# Patient Record
Sex: MALE | Race: WHITE | NOT HISPANIC OR LATINO | Employment: FULL TIME | URBAN - METROPOLITAN AREA
[De-identification: names, ages, dates, MRNs, and addresses within clinical notes are randomized per-mention and may not be internally consistent; named-entity substitution may affect disease eponyms.]

---

## 2024-09-18 ENCOUNTER — PREP FOR PROCEDURE (OUTPATIENT)
Dept: ANESTHESIOLOGY | Facility: HOSPITAL | Age: 54
End: 2024-09-18
Payer: COMMERCIAL

## 2024-09-18 DIAGNOSIS — M16.11 PRIMARY OSTEOARTHRITIS OF RIGHT HIP: Primary | ICD-10-CM

## 2024-09-18 RX ORDER — SODIUM CHLORIDE, SODIUM LACTATE, POTASSIUM CHLORIDE, CALCIUM CHLORIDE 600; 310; 30; 20 MG/100ML; MG/100ML; MG/100ML; MG/100ML
20 INJECTION, SOLUTION INTRAVENOUS CONTINUOUS
OUTPATIENT
Start: 2024-09-18

## 2024-09-20 ENCOUNTER — TELEPHONE (OUTPATIENT)
Dept: ORTHOPEDIC SURGERY | Facility: HOSPITAL | Age: 54
End: 2024-09-20
Payer: COMMERCIAL

## 2024-09-20 NOTE — TELEPHONE ENCOUNTER
Thank you for taking my call today, you have been scheduled for a Joint Replacement class on Tuesday October 22, 2024 from 2:30pm-4:30pm at [facility].  We are located at [address] [place] [address].    You will enter the parking lot off of [facility] and enter the main entrance immediately on your right.    This class is to help you prepare for your Right Anterior Hip Replacement    Upon receipt of your CT authorization, we will schedule that appointment.  The plan is to schedule the CT at 2:00pm prior to class.  You will receive notification via [employer] once this is scheduled.    Please be sure to check your Care Companion Pathway for surveys to complete before class!  We will use this information to track your progress throughout recovery.    Please arrive 15 minutes early. Class will be held on the 2nd floor nursing unit, nursing home down the will in the nutrition area. If you need assistance, please ask staff to direct you to joint replacement class. It is encouraged that you bring your care partner or someone who will be helping you after surgery to this class so that they understand the process also.     Please don't hesitate to reach out if you have any additional questions or concerns.    Gabbie Pandey MBA, BSN, RN-BC  Orthopedic Program Navigator  [facility]   [phone]

## 2024-10-22 ENCOUNTER — HOSPITAL ENCOUNTER (OUTPATIENT)
Dept: RADIOLOGY | Facility: HOSPITAL | Age: 54
Discharge: HOME | End: 2024-10-22
Payer: COMMERCIAL

## 2024-10-22 ENCOUNTER — EDUCATION (OUTPATIENT)
Dept: ORTHOPEDIC SURGERY | Facility: HOSPITAL | Age: 54
End: 2024-10-22
Payer: COMMERCIAL

## 2024-10-22 DIAGNOSIS — M25.551 PAIN IN RIGHT HIP: ICD-10-CM

## 2024-10-22 PROCEDURE — 73700 CT LOWER EXTREMITY W/O DYE: CPT | Mod: RT

## 2024-10-22 NOTE — GROUP NOTE
In addition to the group class activities, Sumanth Ascencio had the following lab work completed:  No orders of the defined types were placed in this encounter.      A new History and Physical was not completed.    This class lasted approximately 2 hours and had 8 participants. The nurse instructor covered the following topics:    MyChart Enrollment  Communication with Care Team  My Chart is the best form of communication to reach all of your caregivers  You can send messages to specific care givers, or a care team  Continued Education  You will be enrolled in a Total Joint Replacement care plan to receive additional education before and after surgery  You can review a short recording of the class content  Access to Medical Records  You can access test results, office notes, appointments, etc.  You can connect to other healthcare systems who use [employer] ([facility], [facility], [facility], etc.)  [employer]  Program Information  Consent to Enroll    Background/Understanding of Joint Replacement Surgery  Potential for same day discharge  Any questions or concerns to be directed to the surgeon's office    How to Prepare for Surgery  Use of Nicotine Products/Smoking  Stop several weeks before surgery  Such products slow down the healing process and increase risk of post-op infection and complications  Clearance for Surgery  Medical Clearance by Specialists  Dental Clearance  Cracked/Broken/Loose teeth left untreated may postpone surgery  The importance of post-op antibiotics for dental visits per surgeon protocol  Preadmission Testing  **Potential for postponed surgery if appropriate clearance is not obtained  Medication Instruction  Follow instructions provided by the doctor who prescribes your medication (typically, but not limited to cardiologist)  Preadmission testing will provide additional instructions during your appointment on what to stop and what to take as you get closer to surgery  For clarification of these  instructions, please call preadmission testing directly - [phone]  Tips for Preparing the home for discharge from the hospital  Care Partner  Requirement for surgery, the patient must have a plan to have help at home  Potential for postponed surgery if plan for home support cannot be established  How the care partner can help after surgery  CHG Body Wash/Mouth Wash  Follow the instructions given at preadmission testing  Body wash is to be used on the body and hair for 5 washes  Mouthwash is to be used the night before and morning of surgery  **This is a system-wide protocol developed by infectious disease professionals, we will not alter our recommendations for those with sensitive skin or those who have special hair needs.  Please follow the instructions as they are written as this will provide the best infection prevention measures for surgery.  Should you have an allergy to one of the products, please discuss with your preadmission team**    What to Expect in the Hospital/At Home  Morning of Surgery NPO Guidelines  Nothing to eat after midnight  Water can be consumed up to 2 hours prior to arrival  Surgical and Post-Surgical Care Team  Surgical Team  Anesthesia Team  Nursing  Physical Therapy  Care Coordinating  Pharmacy  Hospital Arrival Instructions  Arrive at the time provided to you  Consider traffic patterns (rush-hour) based on arrival time  Have arrangements made for a ride home  If discharging same day, care partner should remain at the hospital  Recovering after Surgery  Recovery Room - Visitors are not brought back  Transition to hospital room - 2nd Floor, Visitors will be directed to your room  The presence of and strategies for controlling surgical pain and swelling  The importance of early mobility  Side effects after surgery  What to expect if staying overnight    Discharge Planning  The intended plan for discharge will be for patients to discharge home  All patients require a care partner  (family, friend, neighbor, etc.) to stay with the patient for the first few nights after surgery  The inability to secure help at home will postpone surgery  Home Care Services set up per surgeon order  Physical Therapy  Occupational Therapy  **If desired, private duty care can be arranged by the patient ahead of time**  Outpatient Physical Therapy per surgeon order    Recovering at Home  Wound Care  Follow wound care instructions found in your discharge paperwork  Bandage is water-resistant and you may shower with the bandage  Do not scrub directly over the bandage  Do not submerge in water until cleared (bathtub, hot tub, pool, etc.)    Post-Op Risk Prevention  Infection Prevention  Promptly seek treatment for any infections post-operatively  Routine dental visits must be postponed for 3 months after surgery  Your surgeon may require antibiotics prior to future dental visits  Any concerns for infection not related directly to the knee or the hip should be managed by your primary care provider  Blood Clots  Be sure to complete the course of blood thinning medication as prescribed by your surgeon  Movement every 1-2 hours during the day is encouraged to prevent blood clots  Monitor for signs of blood clots  Wear compression stockings as prescribed by your surgeon  Constipation  Constipation is common following surgery  Drink plenty of fluids  Take stool softener/laxative as prescribed by your surgeon  Move around frequently  Eat foods high in fiber  Fall Prevention  Prepare home ahead of time to clear space to move with walker  Remove throw rugs and electrical cords from walkways  Install railings near any stairways with more than 2 steps  Use night lights for increased visibility at night  Continue to use your assistive device until cleared by surgeon or physical therapy  Dislocation Prevention - Not all procedures will have dislocation precautions  Follow dislocation precautions provided by your surgeon  It is OK  to resume sexual activity about 6 weeks following surgery  Be sure to follow any dislocation precautions assigned    Durable Medical Equipment  Cold Therapy  Breg Cold Therapy Machines  Ice/Gel Packs  Assistive Devices  Folding Walker with Wheels (in the front only)  No Rollators  Crutches if approved by Physical Therapy and Surgeon after surgery  Hip Kits  Raised Toilet Seats  Additional Compression Stockings    Joint Preservation  Healthy Activities when Cleared  Walking  Swimming  Bike Riding  Activities to Avoid  Refrain from repetitive motions which have a high impact on the joint  Gradual Progression  Progress activity slowly, listen to your body  Common Findings - NORMAL after surgery  Clicking/Grinding  Numbness near incision    Physical Therapy  Prehabilitation exercises  START TODAY ON BOTH LEGS  Surgery Specific Precautions  Follow surgery specific precautions found in your discharge paperwork    Follow-Up Visit  All patients will see their surgeon for a follow up visit after surgery  The visit may range from 2-6 weeks after surgery and is surgeon specific      Please don't hesitate to reach out if you have any additional questions or concerns.    Gabbie Pandey MBA, BSN, RN-BC  Delphine Bautista RN  Orthopedic Program Navigators  [facility]   [phone]

## 2024-10-23 ENCOUNTER — APPOINTMENT (OUTPATIENT)
Dept: LAB | Facility: LAB | Age: 54
End: 2024-10-23
Payer: COMMERCIAL

## 2024-10-23 ENCOUNTER — PRE-ADMISSION TESTING (OUTPATIENT)
Dept: PREADMISSION TESTING | Facility: HOSPITAL | Age: 54
End: 2024-10-23
Payer: COMMERCIAL

## 2024-10-23 ENCOUNTER — LAB (OUTPATIENT)
Dept: LAB | Facility: LAB | Age: 54
End: 2024-10-23
Payer: COMMERCIAL

## 2024-10-23 VITALS
HEART RATE: 75 BPM | RESPIRATION RATE: 16 BRPM | WEIGHT: 262.79 LBS | DIASTOLIC BLOOD PRESSURE: 85 MMHG | HEIGHT: 74 IN | BODY MASS INDEX: 33.73 KG/M2 | OXYGEN SATURATION: 96 % | TEMPERATURE: 97.1 F | SYSTOLIC BLOOD PRESSURE: 114 MMHG

## 2024-10-23 DIAGNOSIS — Z01.818 PREOP TESTING: ICD-10-CM

## 2024-10-23 DIAGNOSIS — R73.09 ELEVATED GLUCOSE: ICD-10-CM

## 2024-10-23 DIAGNOSIS — M16.11 PRIMARY OSTEOARTHRITIS OF RIGHT HIP: ICD-10-CM

## 2024-10-23 DIAGNOSIS — Z01.818 PREOP TESTING: Primary | ICD-10-CM

## 2024-10-23 LAB
ANION GAP SERPL CALC-SCNC: 13 MMOL/L (ref 10–20)
ATRIAL RATE: 72 BPM
BASOPHILS # BLD AUTO: 0.03 X10*3/UL (ref 0–0.1)
BASOPHILS NFR BLD AUTO: 0.4 %
BUN SERPL-MCNC: 26 MG/DL (ref 6–23)
CALCIUM SERPL-MCNC: 9.7 MG/DL (ref 8.6–10.3)
CHLORIDE SERPL-SCNC: 103 MMOL/L (ref 98–107)
CO2 SERPL-SCNC: 27 MMOL/L (ref 21–32)
CREAT SERPL-MCNC: 1.11 MG/DL (ref 0.5–1.3)
EGFRCR SERPLBLD CKD-EPI 2021: 79 ML/MIN/1.73M*2
EOSINOPHIL # BLD AUTO: 0.09 X10*3/UL (ref 0–0.7)
EOSINOPHIL NFR BLD AUTO: 1.3 %
ERYTHROCYTE [DISTWIDTH] IN BLOOD BY AUTOMATED COUNT: 11.9 % (ref 11.5–14.5)
EST. AVERAGE GLUCOSE BLD GHB EST-MCNC: 88 MG/DL
GLUCOSE SERPL-MCNC: 91 MG/DL (ref 74–99)
HBA1C MFR BLD: 4.7 %
HCT VFR BLD AUTO: 47.6 % (ref 41–52)
HGB BLD-MCNC: 16.5 G/DL (ref 13.5–17.5)
IMM GRANULOCYTES # BLD AUTO: 0.04 X10*3/UL (ref 0–0.7)
IMM GRANULOCYTES NFR BLD AUTO: 0.6 % (ref 0–0.9)
LYMPHOCYTES # BLD AUTO: 1.84 X10*3/UL (ref 1.2–4.8)
LYMPHOCYTES NFR BLD AUTO: 26.5 %
MCH RBC QN AUTO: 31.5 PG (ref 26–34)
MCHC RBC AUTO-ENTMCNC: 34.7 G/DL (ref 32–36)
MCV RBC AUTO: 91 FL (ref 80–100)
MONOCYTES # BLD AUTO: 0.64 X10*3/UL (ref 0.1–1)
MONOCYTES NFR BLD AUTO: 9.2 %
NEUTROPHILS # BLD AUTO: 4.3 X10*3/UL (ref 1.2–7.7)
NEUTROPHILS NFR BLD AUTO: 62 %
NRBC BLD-RTO: NORMAL /100{WBCS}
P AXIS: 12 DEGREES
P OFFSET: 195 MS
P ONSET: 141 MS
PLATELET # BLD AUTO: 246 X10*3/UL (ref 150–450)
POTASSIUM SERPL-SCNC: 4.2 MMOL/L (ref 3.5–5.3)
PR INTERVAL: 168 MS
Q ONSET: 225 MS
QRS COUNT: 12 BEATS
QRS DURATION: 74 MS
QT INTERVAL: 390 MS
QTC CALCULATION(BAZETT): 427 MS
QTC FREDERICIA: 414 MS
R AXIS: 44 DEGREES
RBC # BLD AUTO: 5.24 X10*6/UL (ref 4.5–5.9)
SODIUM SERPL-SCNC: 139 MMOL/L (ref 136–145)
T AXIS: 32 DEGREES
T OFFSET: 420 MS
VENTRICULAR RATE: 72 BPM
WBC # BLD AUTO: 6.9 X10*3/UL (ref 4.4–11.3)

## 2024-10-23 PROCEDURE — 85025 COMPLETE CBC W/AUTO DIFF WBC: CPT

## 2024-10-23 PROCEDURE — 83036 HEMOGLOBIN GLYCOSYLATED A1C: CPT

## 2024-10-23 PROCEDURE — 93005 ELECTROCARDIOGRAM TRACING: CPT

## 2024-10-23 PROCEDURE — 36415 COLL VENOUS BLD VENIPUNCTURE: CPT

## 2024-10-23 PROCEDURE — 80048 BASIC METABOLIC PNL TOTAL CA: CPT

## 2024-10-23 PROCEDURE — 93010 ELECTROCARDIOGRAM REPORT: CPT | Performed by: INTERNAL MEDICINE

## 2024-10-23 PROCEDURE — 87081 CULTURE SCREEN ONLY: CPT | Mod: BEALAB

## 2024-10-23 RX ORDER — BISMUTH SUBSALICYLATE 262 MG
1 TABLET,CHEWABLE ORAL DAILY
COMMUNITY

## 2024-10-23 RX ORDER — NAPROXEN 250 MG/1
250 TABLET ORAL 2 TIMES DAILY PRN
COMMUNITY

## 2024-10-23 RX ORDER — CHLORHEXIDINE GLUCONATE ORAL RINSE 1.2 MG/ML
15 SOLUTION DENTAL DAILY
Qty: 30 ML | Refills: 0 | Status: SHIPPED | OUTPATIENT
Start: 2024-10-23 | End: 2024-10-25

## 2024-10-23 RX ORDER — CHLORHEXIDINE GLUCONATE 40 MG/ML
SOLUTION TOPICAL DAILY PRN
Qty: 470 ML | Refills: 0 | Status: SHIPPED | OUTPATIENT
Start: 2024-10-23

## 2024-10-23 RX ORDER — LORATADINE 10 MG/1
10 TABLET ORAL DAILY
COMMUNITY

## 2024-10-23 ASSESSMENT — DUKE ACTIVITY SCORE INDEX (DASI)
CAN YOU RUN A SHORT DISTANCE: YES
CAN YOU DO HEAVY WORK AROUND THE HOUSE LIKE SCRUBBING FLOORS OR LIFTING AND MOVING HEAVY FURNITURE: YES
CAN YOU DO MODERATE WORK AROUND THE HOUSE LIKE VACUUMING, SWEEPING FLOORS OR CARRYING GROCERIES: YES
CAN YOU PARTICIPATE IN MODERATE RECREATIONAL ACTIVITIES LIKE GOLF, BOWLING, DANCING, DOUBLES TENNIS OR THROWING A BASEBALL OR FOOTBALL: YES
CAN YOU HAVE SEXUAL RELATIONS: YES
CAN YOU DO LIGHT WORK AROUND THE HOUSE LIKE DUSTING OR WASHING DISHES: YES
CAN YOU PARTICIPATE IN STRENOUS SPORTS LIKE SWIMMING, SINGLES TENNIS, FOOTBALL, BASKETBALL, OR SKIING: NO
CAN YOU TAKE CARE OF YOURSELF (EAT, DRESS, BATHE, OR USE TOILET): YES
CAN YOU WALK A BLOCK OR TWO ON LEVEL GROUND: YES
CAN YOU WALK INDOORS, SUCH AS AROUND YOUR HOUSE: YES
CAN YOU CLIMB A FLIGHT OF STAIRS OR WALK UP A HILL: YES
DASI METS SCORE: 9
CAN YOU DO YARD WORK LIKE RAKING LEAVES, WEEDING OR PUSHING A MOWER: YES
TOTAL_SCORE: 50.7

## 2024-10-23 ASSESSMENT — PAIN - FUNCTIONAL ASSESSMENT: PAIN_FUNCTIONAL_ASSESSMENT: 0-10

## 2024-10-23 ASSESSMENT — ENCOUNTER SYMPTOMS
GASTROINTESTINAL NEGATIVE: 1
RESPIRATORY NEGATIVE: 1
ARTHRALGIAS: 1
EYES NEGATIVE: 1
CARDIOVASCULAR NEGATIVE: 1
ENDOCRINE NEGATIVE: 1
NEUROLOGICAL NEGATIVE: 1
NECK NEGATIVE: 1
CONSTITUTIONAL NEGATIVE: 1

## 2024-10-23 ASSESSMENT — PAIN SCALES - GENERAL: PAINLEVEL_OUTOF10: 3

## 2024-10-23 ASSESSMENT — PAIN DESCRIPTION - DESCRIPTORS: DESCRIPTORS: SORE

## 2024-10-23 ASSESSMENT — LIFESTYLE VARIABLES: SMOKING_STATUS: NONSMOKER

## 2024-10-23 NOTE — CPM/PAT H&P
"CPM/PAT Evaluation       Name: Claudy Ascencio (Claudy Ascencio \"Sumanth\")  /Age: 1970/54 y.o.     Visit Type:   In-Person       Chief Complaint: OA R hip    HPI This is a 55 yo male who is referred to PAT by Dr Mcginnis for evaluation in advance of his R Arthroplasty hip, robotic assisted device 24. He failed conservative therapy. CT of hip 24 demonstrates prominent  osteophytosis about the acetabular rim with subcortical sclerosis and  cystic change. Also, prominent osteophytosis along the femoral head  neck junction with subcortical sclerosis and cystic change superior  margin of the femoral head.     See PMH below    Past Medical History:   Diagnosis Date    Asthma     No issues for a long time    CPAP (continuous positive airway pressure) dependence     On cpap for 2 years    Hearing aid worn     7 years    HL (hearing loss)     Long time    Joint pain     Several years       Past Surgical History:   Procedure Laterality Date    COLONOSCOPY      All clear    NO PAST SURGERIES         Patient  reports being sexually active and has had partner(s) who are female. He reports using the following method of birth control/protection: None.    Family History   Problem Relation Name Age of Onset    Cancer Mother Breast Cancer     Arrhythmia Father      Valvular heart disease Sister         No Known Allergies    Medication Documentation Review Audit       Reviewed by Shanell Ruvalcaba RN (Registered Nurse) on 10/23/24 at 1006      Medication Order Taking? Sig Documenting Provider Last Dose Status   loratadine (Claritin) 10 mg tablet [id number] Yes Take 1 tablet (10 mg) by mouth once daily. Historical Provider, MD 10/23/2024 Morning Active   multivitamin tablet [id number] Yes Take 1 tablet by mouth once daily. Historical Provider, MD Past Week Active   naproxen (Naprosyn) 250 mg tablet [id number] Yes Take 1 tablet (250 mg) by mouth 2 times a day as needed for mild pain (1 - 3). Historical " Provider, MD 10/22/2024 Active                         PAT ROS:   Constitutional:   neg    Neuro/Psych:   neg    Eyes:   neg    Ears:   neg    Nose:   neg    Mouth:   neg    Throat:   neg    Neck:   neg    Cardio:   neg    Respiratory:   neg    Endocrine:   neg    GI:   neg    :   neg    Musculoskeletal:    See hpi   arthralgias  Hematologic:   neg    Skin:  neg        Physical Exam  Vitals and nursing note reviewed. Physical exam within normal limits.          PAT AIRWAY:   Airway:     Mallampati::  III    TM distance::  >3 FB    Neck ROM::  Full      Visit Vitals  /85   Pulse 75   Temp 36.2 °C (97.1 °F) (Temporal)   Resp 16       DASI Risk Score      Flowsheet Row Pre-Admission Testing from 10/23/2024 in [facility] Questionnaire Series Submission from 10/17/2024 in [facility] with Generic Provider Manas   Can you take care of yourself (eat, dress, bathe, or use toilet)?  2.75 filed at 10/23/2024 1043 2.75  filed at 10/17/2024 1124   Can you walk indoors, such as around your house? 1.75 filed at 10/23/2024 1043 1.75  filed at 10/17/2024 1124   Can you walk a block or two on level ground?  2.75 filed at 10/23/2024 1043 2.75  filed at 10/17/2024 1124   Can you climb a flight of stairs or walk up a hill? 5.5 filed at 10/23/2024 1043 5.5  filed at 10/17/2024 1124   Can you run a short distance? 8 filed at 10/23/2024 1043 0  filed at 10/17/2024 1124   Can you do light work around the house like dusting or washing dishes? 2.7 filed at 10/23/2024 1043 2.7  filed at 10/17/2024 1124   Can you do moderate work around the house like vacuuming, sweeping floors or carrying groceries? 3.5 filed at 10/23/2024 1043 3.5  filed at 10/17/2024 1124   Can you do heavy work around the house like scrubbing floors or lifting and moving heavy furniture?  8 filed at 10/23/2024 1043 8  filed at 10/17/2024 1124   Can you do yard work like raking leaves, weeding or pushing a mower? 4.5 filed at 10/23/2024 1043 4.5   filed at 10/17/2024 1124   Can you have sexual relations? 5.25 filed at 10/23/2024 1043 5.25  filed at 10/17/2024 1124   Can you participate in moderate recreational activities like golf, bowling, dancing, doubles tennis or throwing a baseball or football? 6 filed at 10/23/2024 1043 6  filed at 10/17/2024 1124   Can you participate in strenous sports like swimming, singles tennis, football, basketball, or skiing? 0 filed at 10/23/2024 1043 0  filed at 10/17/2024 1124   DASI SCORE 50.7 filed at 10/23/2024 1043 42.7  filed at 10/17/2024 1124   METS Score (Will be calculated only when all the questions are answered) 9 filed at 10/23/2024 1043 8  filed at 10/17/2024 1124          Caprini DVT Assessment      Flowsheet Row [facility]Admission [facility]   DVT Score 9 filed at 10/23/2024 1044   Surgical Factors Elective major lower extremity arthroplasty filed at 10/23/2024 1044   BMI 31-40 (Obesity) filed at 10/23/2024 1044          Modified Frailty Index      Flowsheet Row Pre-Admission Testing from 10/23/2024 in [facility]   Non-independent functional status (problems with dressing, bathing, personal grooming, or cooking) 0 filed at 10/23/2024 1043   History of diabetes mellitus  0 filed at 10/23/2024 1043   History of COPD 0 filed at 10/23/2024 1043   History of CHF No filed at 10/23/2024 1043   History of MI 0 filed at 10/23/2024 1043   History of Percutaneous Coronary Intervention, Cardiac Surgery, or Angina No filed at 10/23/2024 1043   Hypertension requiring the use of medication  0 filed at 10/23/2024 1043   Peripheral vascular disease 0 filed at 10/23/2024 1043   Impaired sensorium (cognitive impairement or loss, clouding, or delirium) 0 filed at 10/23/2024 1043   TIA or CVA withouy residual deficit 0 filed at 10/23/2024 1043   Cerebrovascular accident with deficit 0 filed at 10/23/2024 1043   Modified Frailty Index Calculator 0 filed at 10/23/2024 104           CHADS2 Stroke Risk         N/A 3 to 100%: High Risk   2 to < 3%: Medium Risk   0 to < 2%: Low Risk     Last Change: N/A          This score determines the patient's risk of having a stroke if the patient has atrial fibrillation.        This score is not applicable to this patient. Components are not calculated.          Revised Cardiac Risk Index      Flowsheet Row Pre-Admission Testing from 10/23/2024 in [facility]   High-Risk Surgery (Intraperitoneal, Intrathoracic,Suprainguinal vascular) 0 filed at 10/23/2024 1043   History of ischemic heart disease (History of MI, History of positive exercuse test, Current chest paint considered due to myocardial ischemia, Use of nitrate therapy, ECG with pathological Q Waves) 0 filed at 10/23/2024 1043   History of congestive heart failure (pulmonary edemia, bilateral rales or S3 gallop, Paroxysmal nocturnal dyspnea, CXR showing pulmonary vascular redistribution) 0 filed at 10/23/2024 1043   History of cerebrovascular disease (Prior TIA or stroke) 0 filed at 10/23/2024 1043   Pre-operative insulin treatment 0 filed at 10/23/2024 1043   Pre-operative creatinine>2 mg/dl 0 filed at 10/23/2024 1043   Revised Cardiac Risk Calculator 0 filed at 10/23/2024 1043          Apfel Simplified Score      Flowsheet Row Pre-Admission Testing from 10/23/2024 in [facility]   Smoking status 1 filed at 10/23/2024 1043   History of motion sickness or PONV  1 filed at 10/23/2024 1043   Use of postoperative opioids 0 filed at 10/23/2024 1043   Gender - Female 0=No filed at 10/23/2024 1043   Apfel Simplified Score Calculator 2 filed at 10/23/2024 1043          Risk Analysis Index Results This Encounter         10/23/2024  1042             Do you live in a place other than your own home?: 1    Any history of chronic (long-term) congestive heart failure (CHF)?: 0 No    Any shortness of breath when resting?: 0 No    In the past five years, have you been diagnosed  with or treated for cancer?: No    MILLAN Cancer History: Patient does not indicate history of cancer          Stop Bang Score      Flowsheet Row Pre-Admission Testing from 10/23/2024 in [facility] Questionnaire Series Submission from 10/17/2024 in [facility] with Generic Provider Manas   Do you snore loudly? 1 filed at 10/23/2024 1011 1  filed at 10/17/2024 1124   Do you often feel tired or fatigued after your sleep? 0 filed at 10/23/2024 1011 0  filed at 10/17/2024 1124   Has anyone ever observed you stop breathing in your sleep? 1 filed at 10/23/2024 1011 1  filed at 10/17/2024 1124   Do you have or are you being treated for high blood pressure? 0 filed at 10/23/2024 1011 0  filed at 10/17/2024 1124   Recent BMI (Calculated) 34.2 filed at 10/23/2024 1011 32.1  filed at 10/17/2024 1124   Is BMI greater than 35 kg/m2? 0=No filed at 10/23/2024 1011 0=No  filed at 10/17/2024 1124   Age older than 50 years old? 1=Yes filed at 10/23/2024 1011 1=Yes  filed at 10/17/2024 1124   Is your neck circumference greater than 17 inches (Male) or 16 inches (Female)? 1 filed at 10/23/2024 1011 --   Gender - Male 1=Yes filed at 10/23/2024 1011 1=Yes  filed at 10/17/2024 1124   STOP-BANG Total Score 5 filed at 10/23/2024 1011 --          Prodigy: High Risk  Total Score: 8              Prodigy Gender Score          ARISCAT Score for Postoperative Pulmonary Complications      Flowsheet Row Pre-Admission Testing from 10/23/2024 in [facility]   Age, years  3 filed at 10/23/2024 1042   Preoperative SpO2 0 filed at 10/23/2024 1042   Respiratory infection in the last month Either upper or lower (i.e., URI, bronchitis, pneumonia), with fever and antibiotic treatment 0 filed at 10/23/2024 1042   Preoperative anemoa (Hgb less than 10 g/dl) 0 filed at 10/23/2024 1042   Surgical incision  0 filed at 10/23/2024 1042   Duration of surgery  16 filed at 10/23/2024 1042   Emergency Procedure  0 filed at 10/23/2024  "1042   ARISCAT Total Score  13 filed at 10/23/2024 1042          Chaz Perioperative Risk for Myocardial Infarction or Cardiac Arrest (DOMINIC)      Flowsheet Row Pre-Admission Testing from 10/23/2024 in [facility]   Age 1.06 filed at 10/23/2024 1042   Functional Status  0 filed at 10/23/2024 1042   ASA Class  -3.29 filed at 10/23/2024 1042   Creatinine 0 filed at 10/23/2024 1042   Type of Procedure  0.80 filed at 10/23/2024 1042   DOMINIC Total Score  -2.49 filed at 10/23/2024 1042        Assessment and Plan   55 yo male presents to [facility] for risk assessment and preoperative optimization in advance of his hip surgery.     Today VS stable, He is having ongoing R pain level 3/10, rest makes him feel better     Neuro:  No diagnosis or significant findings on chart review or clinical presentation and evaluation.     HEENT/Airway:  [place], wears hearing aid    Cardiovascular:  Denies chest pain, shortness of breathe, dizziness, palpations, or lightheadedness    Acceptable surgical risk. No additional preoperative testing is currently indicated.    EKG - 10/23/24 preliminary SR    Pulmonary:  H/O asthma  H/O KYRIE, on cpap, instructed to bring on day of surgery    Renal:   No diagnosis or significant findings on chart review, or clinical presentation and evaluation.   No results found for: \"GLUCOSE\", \"CALCIUM\", \"NA\", \"K\", \"CO2\", \"CL\", \"BUN\", \"CREATININE\"    Endocrine:  No diagnosis or significant findings on chart review or clinical presentation and evaluation.     No results found for: \"HGBA1C\"    Hematologic:  No diagnosis or significant findings on chart review or clinical presentation and evaluation.  No results found for: \"WBC\", \"HGB\", \"HCT\", \"MCV\", \"PLT\"    Pt supplied education/VTE handout    Gastrointestinal:   No diagnosis or significant findings on chart review or clinical presentation and evaluation.   EAT-10 score of 0 - self-perceived oropharyngeal dysphagia scale (0-40)      :  No diagnosis or " significant findings on chart review or clinical presentation and evaluation.     Infectious disease:   No diagnosis or significant findings on chart review or clinical presentation and evaluation.   Prescription provided for CHG body wash and dental rinse. CHG use instructions reviewed and provided to patient.  Staph screen collected    Musculoskeletal:   See HPI    Preoperative risk assessment  ASA II   NSQIP - Predicted length of stay days  PAT Testing - bmp, cbc, A1c, EKG, MRSA    Anesthesia:  No anesthesia complications but has not had general anesthesia    denies dental issues  Smoker-denies  Drugs-denies  ETOH- 8 drinks per week  Denies  personal/family issues with Anesthesia  No nickel, shell fish, or iodine allergies    CHLORHEXIDINE .12% DENTAL RINSE E PRESCIRBED PER  INFECTION PREVENTION PROTOCOL. PATIENT EDUCATED   Patient advised to call PAT if does not receive Mouthwash    Face to Face patient contact time 45 min    UZMA Means 10/23/2024 10:58 AM

## 2024-10-23 NOTE — H&P (VIEW-ONLY)
"CPM/PAT Evaluation       Name: Claudy Ascencio (Claudy Ascencio \"Sumanth\")  /Age: 1970/54 y.o.     Visit Type:   In-Person       Chief Complaint: OA R hip    HPI This is a 55 yo male who is referred to PAT by Dr Mcginnis for evaluation in advance of his R Arthroplasty hip, robotic assisted device 24. He failed conservative therapy. CT of hip 24 demonstrates prominent  osteophytosis about the acetabular rim with subcortical sclerosis and  cystic change. Also, prominent osteophytosis along the femoral head  neck junction with subcortical sclerosis and cystic change superior  margin of the femoral head.     See PMH below    Past Medical History:   Diagnosis Date    Asthma     No issues for a long time    CPAP (continuous positive airway pressure) dependence     On cpap for 2 years    Hearing aid worn     7 years    HL (hearing loss)     Long time    Joint pain     Several years       Past Surgical History:   Procedure Laterality Date    COLONOSCOPY      All clear    NO PAST SURGERIES         Patient  reports being sexually active and has had partner(s) who are female. He reports using the following method of birth control/protection: None.    Family History   Problem Relation Name Age of Onset    Cancer Mother Breast Cancer     Arrhythmia Father      Valvular heart disease Sister         No Known Allergies    Medication Documentation Review Audit       Reviewed by Shanell Ruvalcaba RN (Registered Nurse) on 10/23/24 at 1006      Medication Order Taking? Sig Documenting Provider Last Dose Status   loratadine (Claritin) 10 mg tablet [id number] Yes Take 1 tablet (10 mg) by mouth once daily. Historical Provider, MD 10/23/2024 Morning Active   multivitamin tablet [id number] Yes Take 1 tablet by mouth once daily. Historical Provider, MD Past Week Active   naproxen (Naprosyn) 250 mg tablet [id number] Yes Take 1 tablet (250 mg) by mouth 2 times a day as needed for mild pain (1 - 3). Historical " Provider, MD 10/22/2024 Active                         PAT ROS:   Constitutional:   neg    Neuro/Psych:   neg    Eyes:   neg    Ears:   neg    Nose:   neg    Mouth:   neg    Throat:   neg    Neck:   neg    Cardio:   neg    Respiratory:   neg    Endocrine:   neg    GI:   neg    :   neg    Musculoskeletal:    See hpi   arthralgias  Hematologic:   neg    Skin:  neg        Physical Exam  Vitals and nursing note reviewed. Physical exam within normal limits.          PAT AIRWAY:   Airway:     Mallampati::  III    TM distance::  >3 FB    Neck ROM::  Full      Visit Vitals  /85   Pulse 75   Temp 36.2 °C (97.1 °F) (Temporal)   Resp 16       DASI Risk Score      Flowsheet Row Pre-Admission Testing from 10/23/2024 in [facility] Questionnaire Series Submission from 10/17/2024 in [facility] with Generic Provider Manas   Can you take care of yourself (eat, dress, bathe, or use toilet)?  2.75 filed at 10/23/2024 1043 2.75  filed at 10/17/2024 1124   Can you walk indoors, such as around your house? 1.75 filed at 10/23/2024 1043 1.75  filed at 10/17/2024 1124   Can you walk a block or two on level ground?  2.75 filed at 10/23/2024 1043 2.75  filed at 10/17/2024 1124   Can you climb a flight of stairs or walk up a hill? 5.5 filed at 10/23/2024 1043 5.5  filed at 10/17/2024 1124   Can you run a short distance? 8 filed at 10/23/2024 1043 0  filed at 10/17/2024 1124   Can you do light work around the house like dusting or washing dishes? 2.7 filed at 10/23/2024 1043 2.7  filed at 10/17/2024 1124   Can you do moderate work around the house like vacuuming, sweeping floors or carrying groceries? 3.5 filed at 10/23/2024 1043 3.5  filed at 10/17/2024 1124   Can you do heavy work around the house like scrubbing floors or lifting and moving heavy furniture?  8 filed at 10/23/2024 1043 8  filed at 10/17/2024 1124   Can you do yard work like raking leaves, weeding or pushing a mower? 4.5 filed at 10/23/2024 1043 4.5   filed at 10/17/2024 1124   Can you have sexual relations? 5.25 filed at 10/23/2024 1043 5.25  filed at 10/17/2024 1124   Can you participate in moderate recreational activities like golf, bowling, dancing, doubles tennis or throwing a baseball or football? 6 filed at 10/23/2024 1043 6  filed at 10/17/2024 1124   Can you participate in strenous sports like swimming, singles tennis, football, basketball, or skiing? 0 filed at 10/23/2024 1043 0  filed at 10/17/2024 1124   DASI SCORE 50.7 filed at 10/23/2024 1043 42.7  filed at 10/17/2024 1124   METS Score (Will be calculated only when all the questions are answered) 9 filed at 10/23/2024 1043 8  filed at 10/17/2024 1124          Caprini DVT Assessment      Flowsheet Row Pre-Admission Testing from 10/23/2024 in [facility]   DVT Score 9 filed at 10/23/2024 1044   Surgical Factors Elective major lower extremity arthroplasty filed at 10/23/2024 1044   BMI 31-40 (Obesity) filed at 10/23/2024 1044          Modified Frailty Index      Flowsheet Row Pre-Admission Testing from 10/23/2024 in [facility]   Non-independent functional status (problems with dressing, bathing, personal grooming, or cooking) 0 filed at 10/23/2024 1043   History of diabetes mellitus  0 filed at 10/23/2024 1043   History of COPD 0 filed at 10/23/2024 1043   History of CHF No filed at 10/23/2024 1043   History of MI 0 filed at 10/23/2024 1043   History of Percutaneous Coronary Intervention, Cardiac Surgery, or Angina No filed at 10/23/2024 1043   Hypertension requiring the use of medication  0 filed at 10/23/2024 1043   Peripheral vascular disease 0 filed at 10/23/2024 1043   Impaired sensorium (cognitive impairement or loss, clouding, or delirium) 0 filed at 10/23/2024 1043   TIA or CVA withouy residual deficit 0 filed at 10/23/2024 1043   Cerebrovascular accident with deficit 0 filed at 10/23/2024 1043   Modified Frailty Index Calculator 0 filed at 10/23/2024 1047           CHADS2 Stroke Risk         N/A 3 to 100%: High Risk   2 to < 3%: Medium Risk   0 to < 2%: Low Risk     Last Change: N/A          This score determines the patient's risk of having a stroke if the patient has atrial fibrillation.        This score is not applicable to this patient. Components are not calculated.          Revised Cardiac Risk Index      Flowsheet Row Pre-Admission Testing from 10/23/2024 in [facility]   High-Risk Surgery (Intraperitoneal, Intrathoracic,Suprainguinal vascular) 0 filed at 10/23/2024 1043   History of ischemic heart disease (History of MI, History of positive exercuse test, Current chest paint considered due to myocardial ischemia, Use of nitrate therapy, ECG with pathological Q Waves) 0 filed at 10/23/2024 1043   History of congestive heart failure (pulmonary edemia, bilateral rales or S3 gallop, Paroxysmal nocturnal dyspnea, CXR showing pulmonary vascular redistribution) 0 filed at 10/23/2024 1043   History of cerebrovascular disease (Prior TIA or stroke) 0 filed at 10/23/2024 1043   Pre-operative insulin treatment 0 filed at 10/23/2024 1043   Pre-operative creatinine>2 mg/dl 0 filed at 10/23/2024 1043   Revised Cardiac Risk Calculator 0 filed at 10/23/2024 1043          Apfel Simplified Score      Flowsheet Row Pre-Admission Testing from 10/23/2024 in [facility]   Smoking status 1 filed at 10/23/2024 1043   History of motion sickness or PONV  1 filed at 10/23/2024 1043   Use of postoperative opioids 0 filed at 10/23/2024 1043   Gender - Female 0=No filed at 10/23/2024 1043   Apfel Simplified Score Calculator 2 filed at 10/23/2024 1043          Risk Analysis Index Results This Encounter         10/23/2024  1042             Do you live in a place other than your own home?: 1    Any history of chronic (long-term) congestive heart failure (CHF)?: 0 No    Any shortness of breath when resting?: 0 No    In the past five years, have you been diagnosed  with or treated for cancer?: No    MILLAN Cancer History: Patient does not indicate history of cancer          Stop Bang Score      Flowsheet Row Pre-Admission Testing from 10/23/2024 in [facility] Questionnaire Series Submission from 10/17/2024 in [facility] with Generic Provider Manas   Do you snore loudly? 1 filed at 10/23/2024 1011 1  filed at 10/17/2024 1124   Do you often feel tired or fatigued after your sleep? 0 filed at 10/23/2024 1011 0  filed at 10/17/2024 1124   Has anyone ever observed you stop breathing in your sleep? 1 filed at 10/23/2024 1011 1  filed at 10/17/2024 1124   Do you have or are you being treated for high blood pressure? 0 filed at 10/23/2024 1011 0  filed at 10/17/2024 1124   Recent BMI (Calculated) 34.2 filed at 10/23/2024 1011 32.1  filed at 10/17/2024 1124   Is BMI greater than 35 kg/m2? 0=No filed at 10/23/2024 1011 0=No  filed at 10/17/2024 1124   Age older than 50 years old? 1=Yes filed at 10/23/2024 1011 1=Yes  filed at 10/17/2024 1124   Is your neck circumference greater than 17 inches (Male) or 16 inches (Female)? 1 filed at 10/23/2024 1011 --   Gender - Male 1=Yes filed at 10/23/2024 1011 1=Yes  filed at 10/17/2024 1124   STOP-BANG Total Score 5 filed at 10/23/2024 1011 --          Prodigy: High Risk  Total Score: 8              Prodigy Gender Score          ARISCAT Score for Postoperative Pulmonary Complications      Flowsheet Row Pre-Admission Testing from 10/23/2024 in [facility]   Age, years  3 filed at 10/23/2024 1042   Preoperative SpO2 0 filed at 10/23/2024 1042   Respiratory infection in the last month Either upper or lower (i.e., URI, bronchitis, pneumonia), with fever and antibiotic treatment 0 filed at 10/23/2024 1042   Preoperative anemoa (Hgb less than 10 g/dl) 0 filed at 10/23/2024 1042   Surgical incision  0 filed at 10/23/2024 1042   Duration of surgery  16 filed at 10/23/2024 1042   Emergency Procedure  0 filed at 10/23/2024  "1042   ARISCAT Total Score  13 filed at 10/23/2024 1042          Chaz Perioperative Risk for Myocardial Infarction or Cardiac Arrest (DOMINIC)      Flowsheet Row Pre-Admission Testing from 10/23/2024 in [facility]   Age 1.06 filed at 10/23/2024 1042   Functional Status  0 filed at 10/23/2024 1042   ASA Class  -3.29 filed at 10/23/2024 1042   Creatinine 0 filed at 10/23/2024 1042   Type of Procedure  0.80 filed at 10/23/2024 1042   DOMINIC Total Score  -2.49 filed at 10/23/2024 1042        Assessment and Plan   53 yo male presents to [facility] for risk assessment and preoperative optimization in advance of his hip surgery.     Today VS stable, He is having ongoing R pain level 3/10, rest makes him feel better     Neuro:  No diagnosis or significant findings on chart review or clinical presentation and evaluation.     HEENT/Airway:  [place], wears hearing aid    Cardiovascular:  Denies chest pain, shortness of breathe, dizziness, palpations, or lightheadedness    Acceptable surgical risk. No additional preoperative testing is currently indicated.    EKG - 10/23/24 preliminary SR    Pulmonary:  H/O asthma  H/O KYRIE, on cpap, instructed to bring on day of surgery    Renal:   No diagnosis or significant findings on chart review, or clinical presentation and evaluation.   No results found for: \"GLUCOSE\", \"CALCIUM\", \"NA\", \"K\", \"CO2\", \"CL\", \"BUN\", \"CREATININE\"    Endocrine:  No diagnosis or significant findings on chart review or clinical presentation and evaluation.     No results found for: \"HGBA1C\"    Hematologic:  No diagnosis or significant findings on chart review or clinical presentation and evaluation.  No results found for: \"WBC\", \"HGB\", \"HCT\", \"MCV\", \"PLT\"    Pt supplied education/VTE handout    Gastrointestinal:   No diagnosis or significant findings on chart review or clinical presentation and evaluation.   EAT-10 score of 0 - self-perceived oropharyngeal dysphagia scale (0-40)      :  No diagnosis or " significant findings on chart review or clinical presentation and evaluation.     Infectious disease:   No diagnosis or significant findings on chart review or clinical presentation and evaluation.   Prescription provided for CHG body wash and dental rinse. CHG use instructions reviewed and provided to patient.  Staph screen collected    Musculoskeletal:   See HPI    Preoperative risk assessment  ASA II   NSQIP - Predicted length of stay days  PAT Testing - bmp, cbc, A1c, EKG, MRSA    Anesthesia:  No anesthesia complications but has not had general anesthesia    denies dental issues  Smoker-denies  Drugs-denies  ETOH- 8 drinks per week  Denies  personal/family issues with Anesthesia  No nickel, shell fish, or iodine allergies    CHLORHEXIDINE .12% DENTAL RINSE E PRESCIRBED PER  INFECTION PREVENTION PROTOCOL. PATIENT EDUCATED   Patient advised to call PAT if does not receive Mouthwash    Face to Face patient contact time 45 min    UZMA Means 10/23/2024 10:58 AM

## 2024-10-23 NOTE — PREPROCEDURE INSTRUCTIONS
Medication List            Accurate as of October 23, 2024 10:23 AM. Always use your most recent med list.                * chlorhexidine 0.12 % solution  Commonly known as: Peridex  Use 15 mL in the mouth or throat once daily for 2 doses.  Medication Adjustments for Surgery: Take/Use as prescribed     * chlorhexidine 4 % external liquid  Commonly known as: Hibiclens  Apply topically once daily as needed for wound care.  Medication Adjustments for Surgery: Take/Use as prescribed     loratadine 10 mg tablet  Commonly known as: Claritin  Medication Adjustments for Surgery: Take on the morning of surgery  Notes to patient: If needed     multivitamin tablet  Additional Medication Adjustments for Surgery: Take last dose 7 days before surgery  Notes to patient: Last dose 10/29/24     naproxen 250 mg tablet  Commonly known as: Naprosyn  Additional Medication Adjustments for Surgery: Take last dose 7 days before surgery  Notes to patient: Last dose 10/29/24           * This list has 2 medication(s) that are the same as other medications prescribed for you. Read the directions carefully, and ask your doctor or other care provider to review them with you.                If no issues with your liver you may take Tylenol 2-500 mg tablets every 8 hrs around the clock for pain including morning of surgery with a sip of water    STOP VITAMINS, SUPPLEMENTS, HERBS, PROBIOTICS, AND FISH OIL, NO MOTRIN OR ADVIL OR ALEVE 7 DAYS BEFORE SURGERY       CONTACT SURGEON'S OFFICE IF YOU DEVELOP:  * Fever = 100.4 F   * New respiratory symptoms (e.g. cough, shortness of breath, respiratory distress, sore throat)  * Recent loss of taste or smell  *Flu like symptoms such as headache, fatigue or gastrointestinal symptoms  * You develop any open sores, shingles, burning or painful urination   AND/OR:  * You no longer wish to have the surgery.  * Any other personal circumstances change that may lead to the need to cancel or defer this  surgery.  *You were admitted to any hospital within one week of your planned procedure.     SMOKING:  *Quitting smoking can make a huge difference to your health and recovery from surgery.    *If you need help with quitting, call 5-048-QUIT-NOW.     THE DAY BEFORE SURGERY:  *Do not eat any food after midnight the night before your surgery.   *You may have up to TEN OUNCES of clear liquids until TWO hours before your instructed ARRIVAL TIME to hospital. This includes water, black tea/coffee, (no milk or cream) apple juice, clear broth and electrolyte drinks (Gatorade). Please avoid clear liquids that are red in color.   *You may chew gum/mints up to TWO hours before your surgery/procedure.    Additional Instructions:      Seven/Six Days before Surgery:  Review your medication instructions, stop indicated medications  Five Days before Surgery:  Review your medication instructions, stop indicated medications  Begin using your Hibiclens, if prescribed  Three Days before Surgery:  Review your medication instructions, stop indicated medications  The Day before Surgery:  Start using 0.12% CHG mouthwash-if prescribed  No smoking or alcohol use 24 hours before surgery  Review your medication instructions, stop indicated medications  You will be contacted regarding the time of your arrival to facility and surgery time  Do not eat any food after Midnight  Day of Surgery:  Review your medication instructions, take indicated medications  If you have diabetes, please check your fasting blood sugar upon awakening.  If fasting blood sugar is <80 mg/dl, drink 100 ml of apple juice, time limit of 2 hours before  You may have clear liquids until TWO hours before surgery/procedure.  This includes water, black tea/coffee, (no milk or cream) apple juice and electrolyte drinks (Gatorade)  You may chew gum up to TWO hours before your surgery/procedure  Wear  comfortable loose fitting clothing  Do not use moisturizers, creams, lotions or  perfume  All jewelry and valuables should be left at home     SURGICAL TIME:  *You will be contacted between 2 p.m. and 3 p.m. the business day before your surgery with your arrival time.  *If you haven't received a call by 3pm, call [phone]  *Scheduled surgery times may change and you will be notified if this occurs-check your personal voicemail for any updates.     ON THE MORNING OF SURGERY:  *Wear comfortable, loose fitting clothing.   *Do not use moisturizers, creams, lotions or perfume.  *All jewelry and valuables should be left at home.  *Prosthetic devices such as contact lenses, hearing aids, dentures, eyelash extensions, hairpins and body piercing must be removed before surgery.     BRING WITH YOU:  *Photo ID and insurance card  *Current list of medications and allergies  *Pacemaker/Defibrillator/Heart stent cards  *CPAP machine and mask  *Slings/splints/crutches  *Copy of your complete Advanced Directive/DHPOA-if applicable  *Neurostimulator implant remote     PARKING AND ARRIVAL:  *Check in at the Main Entrance desk and let them know you are here for surgery.     IF YOU ARE HAVING OUTPATIENT/SAME DAY SURGERY:  *A responsible adult MUST accompany you at the time of discharge and stay with you for 24 hours after your surgery.  *You may NOT drive yourself home after surgery.  *You may use a taxi or ride sharing service ([employer], [employer]) to return home ONLY if you are accompanied by a friend or family member.  *Instructions for resuming your medications will be provided by your surgeon.     Thank you for coming to Pre Admission testing.      If I have prescribe medication please don't forget to  at your pharmacy.      Any questions about today's visit call [phone] and leave a message in the general mailbox.     Patient instructed to ambulate as soon as possible postoperatively to decrease thromboembolic risk.     Olga Candelaria, APRN-CNP     Thank you for visiting the [facility].  If you have any changes to your health condition, please call the surgeons office to alert them and give them details of your symptoms.        Preoperative Fasting Guidelines     Why must I stop eating and drinking near surgery time?  With sedation, food or liquid in your stomach can enter your lungs causing serious complications  Increases nausea and vomiting     When do I need to stop eating and drinking before my surgery?  Do not eat any food after midnight the night before your surgery/procedure.  You may have up to TEN ounces of clear liquid until TWO hours before your instructed arrival time to the hospital.  This includes water, black tea/coffee, (no milk or cream) apple juice, and electrolyte drinks (Gatorade)  You may chew gum until TWO hours before your surgery/procedure       Preoperative Brain Exercises     What are brain exercises?  A brain exercise is any activity that engages your thinking (cognitive) skills.     What types of activities are considered brain exercises?  Jigsaw puzzles, crossword puzzles, word jumble, memory games, word search, and many more.  Many can be found free online or on your phone via a mobile amairani.     Why should I do brain exercises before my surgery?  More recent research has shown brain exercise before surgery can lower the risk of postoperative delirium (confusion) which can be especially important for older adults.  Patients who did brain exercises for 5 to 10 hours the days before surgery, cut their risk of postoperative delirium in half up to 1 week after surgery.                 [facility]     Preoperative Deep Breathing Exercises     Why it is important to do deep breathing exercises before my surgery?  Deep breathing exercises strengthen your breathing muscles.  This helps you to recover after your surgery and decreases the chance of breathing complications.        How are the deep breathing exercises done?  Sit straight with  your back supported.  Breathe in deeply and slowly through your nose. Your lower rib cage should expand and your abdomen may move forward.  Hold that breath for 3 to 5 seconds.  Breathe out through pursed lips, slowly and completely.  Rest and repeat 10 times every hour while awake.  Rest longer if you become dizzy or lightheaded.                [facility]     Preoperative Deep Breathing Exercises     Why it is important to do deep breathing exercises before my surgery?  Deep breathing exercises strengthen your breathing muscles.  This helps you to recover after your surgery and decreases the chance of breathing complications.        How are the deep breathing exercises done?  Sit straight with your back supported.  Breathe in deeply and slowly through your nose. Your lower rib cage should expand and your abdomen may move forward.  Hold that breath for 3 to 5 seconds.  Breathe out through pursed lips, slowly and completely.  Rest and repeat 10 times every hour while awake.  Rest longer if you become dizzy or lightheaded.        Patient Information: Incentive Spirometer  What is an incentive spirometer?  An incentive spirometer is a device used before and after surgery to “exercise” your lungs.  It helps you to take deeper breaths to expand your lungs.  Below is an example of a basic incentive spirometer.  The device you receive may differ slightly but they all function the same.    Why do I need to use an incentive spirometer?  Using your incentive spirometer prepares your lungs for surgery and helps prevent lung problems after surgery.  How do I use my incentive spirometer?  When you're using your incentive spirometer, make sure to breathe through your mouth. If you breathe through your nose, the incentive spirometer won't work properly. You can hold your nose if you have trouble.  If you feel dizzy at any time, stop and rest. Try again at a later time.  Follow the steps below:  Set up  your incentive spirometer, expand the flexible tubing and connect to the outlet.  Sit upright in a chair or bed. Hold the incentive spirometer at eye level.   Put the mouthpiece in your mouth and close your lips tightly around it. Slowly breathe out (exhale) completely.  Breathe in (inhale) slowly through your mouth as deeply as you can. As you take a breath, you will see the piston rise inside the large column. While the piston rises, the indicator should move upwards. It should stay in between the 2 arrows (see Figure).  Try to get the piston as high as you can, while keeping the indicator between the arrows.   If the indicator doesn't stay between the arrows, you're breathing either too fast or too slow.  When you get it as high as you can, hold your breath for 10 seconds, or as long as possible. While you're holding your breath, the piston will slowly fall to the base of the spirometer.  Once the piston reaches the bottom of the spirometer, breathe out slowly through your mouth. Rest for a few seconds.  Repeat 10 times. Try to get the piston to the same level with each breath.  Repeat every hour while awake  You can carefully clean the outside of the mouthpiece with an alcohol wipe or soap and water.       Patient and Family Education             Ways You Can Help Prevent Blood Clots                    This handout explains some simple things you can do to help prevent blood clots.      Blood clots are blockages that can form in the body's veins. When a blood clot forms in your deep veins, it may be called a deep vein thrombosis, or DVT for short. Blood clots can happen in any part of the body where blood flows, but they are most common in the arms and legs. If a piece of a blood clot breaks free and travels to the lungs, it is called a pulmonary embolus (PE). A PE can be a very serious problem.         Being in the hospital or having surgery can raise your chances of getting a blood clot because you may not be  well enough to move around as much as you normally do.         Ways you can help prevent blood clots in the hospital           Wearing SCDs. SCDs stands for Sequential Compression Devices.   SCDs are special sleeves that wrap around your legs  They attach to a pump that fills them with air to gently squeeze your legs every few minutes.   This helps return the blood in your legs to your heart.   SCDs should only be taken off when walking or bathing.   SCDs may not be comfortable, but they can help save your life.                                            Wearing compression stockings - if your doctor orders them. These special snug fitting stockings gently squeeze your legs to help blood flow.       Walking. Walking helps move the blood in your legs.   If your doctor says it is ok, try walking the halls at least   5 times a day. Ask us to help you get up, so you don't fall.      Taking any blood thinning medicines your doctor orders.        Page 1 of 2            [facility]; 3/23   Ways you can help prevent blood clots at home         Wearing compression stockings - if your doctor orders them. ? Walking - to help move the blood in your legs.       Taking any blood thinning medicines your doctor orders.      Signs of a blood clot or PE        Tell your doctor or nurse know right away if you have of the problems listed below.    If you are at home, seek medical care right away. Call 911 for chest pain or problems breathing.                Signs of a blood clot (DVT) - such as pain,  swelling, redness or warmth in your arm or leg      Signs of a pulmonary embolism (PE) - such as chest pain or feeling short of breath    Patient Information: Pre-Operative Infection Prevention Measures     Why did I have my nose, under my arms, and groin swabbed?  The purpose of the swab is to identify Staphylococcus aureus inside your nose or on your skin.  The swab was sent to the laboratory for culture.  A positive  swab/culture for Staphylococcus aureus is called colonization or carriage.      What is Staphylococcus aureus?  Staphylococcus aureus, also known as “staph”, is a germ found on the skin or in the nose of healthy people.  Sometimes Staphylococcus aureus can get into the body and cause an infection.  This can be minor (such as pimples, boils, or other skin problems).  It might also be serious (such as a blood infection, pneumonia, or a surgical site infection).    What is Staphylococcus aureus colonization or carriage?  Colonization or carriage means that a person has the germ but is not sick from it.  These bacteria can be spread on the hands or when breathing or sneezing.    How is Staphylococcus aureus spread?  It is most often spread by close contact with a person or item that carries it.    What happens if my culture is positive for Staphylococcus aureus?  Your doctor/medical team will use this information to guide any antibiotic treatment which may be necessary.  Regardless of the culture results, we will clean the inside of your nose with a betadine swab just before you have your surgery.      Will I get an infection if I have Staphylococcus aureus in my nose or on my skin?  Anyone can get an infection with Staphylococcus aureus.  However, the best way to reduce your risk of infection is to follow the instructions provided to you for the use of your CHG soap and dental rinse.        Patient Information: Oral/Dental Rinse    What is oral/dental rinse?   It is a mouthwash. It is a way of cleaning the mouth with a germ-killing solution before your surgery.  The solution contains chlorhexidine, commonly known as CHG.   It is used inside the mouth to kill a bacteria known as Staphylococcus aureus.  Let your doctor know if you are allergic to Chlorhexidine.    We have sent a prescription for CHG 0.12% mouthwash to your preferred pharmacy.  If you have not already, Please  your prescription and start using the  day before before surgery.  Follow the instruction sheet provided to you at your CPM/PAT appointment. Please contact [facility] if you do not receive your CHG mouthwash prescription.     Why do I need to use CHG oral/dental rinse?  The CHG oral/dental rinse helps to kill a bacteria in your mouth known as Staphylococcus aureus.     This reduces the risk of infection at the surgical site.      Using your CHG oral/dental rinse  STEPS:  Use your CHG oral/dental rinse after you brush your teeth the night before (at bedtime) and the morning of your surgery.  Follow all directions on your prescription label.    Use the cap on the container to measure 15ml   Swish (gargle if you can) the mouthwash in your mouth for at least 30 seconds, (do not swallow) and spit out  After you use your CHG rinse, do not rinse your mouth with water, drink or eat.  Please refer to the prescription label for the appropriate time to resume oral intake      What side effects might I have using the CHG oral/dental rinse?  CHG rinse will stick to plaque on the teeth.  Brush and floss just before use.  Teeth brushing will help avoid staining of plaque during use.      Patient Information: Home Preoperative Antibacterial Shower      What is a home preoperative antibacterial shower?  This shower is a way of cleaning the skin with a germ-killing solution before surgery.  The solution contains chlorhexidine, commonly known as CHG.  CHG is a skin cleanser with germ-killing ability.  Let your doctor know if you are allergic to chlorhexidine.    Why do I need to take a preoperative antibacterial shower?  Skin is not sterile.  It is best to try to make your skin as free of germs as possible before surgery.  Proper cleansing with a germ-killing soap before surgery can lower the number of germs on your skin.  This helps to reduce the risk of infection at the surgical site.  Following the instructions listed below will help you prepare your skin for surgery.       How do I use the solution?  Steps:  Begin using your CHG soap 5 days before your scheduled surgery on ________________________.    First, wash and rinse your hair using the CHG soap. Keep CHG soap away from ear canals and eyes.  Rinse completely, do not condition.  Hair extensions should be removed.  Wash your face with your normal soap and rinse.    Apply the CHG solution to a clean wet washcloth.  Turn the water off or move away from the water spray to avoid premature rinsing of the CHG soap as you are applying.   Firmly lather your entire body from the neck down.  Do not use on your face.  Pay special attention to the area(s) where your incision(s) will be located unless they are on your face.  Avoid scrubbing your skin too hard.  The important point is to have the CHG soap sit on your skin for 3 minutes.    When the 3 minutes are up, turn on the water and rinse the CHG solution off your body completely.   DO NOT wash with regular soap after you have used the CHG soap solution  Pat yourself dry with a clean, freshly-laundered towel.  DO NOT apply powders, deodorants, or lotions.  Dress in clean, freshly laundered nightclothes.    Be sure to sleep with clean, freshly laundered sheets.  Be aware that CHG will cause stains on fabrics; if you wash them with bleach after use.  Rinse your washcloth and other linens that have contact with CHG completely.  Use only non-chlorine detergents to launder the items used.   The morning of surgery is the fifth day.  Repeat the above steps and dress in clean comfortable clothing     Whom should I contact if I have any questions regarding the use of CHG soap?  Call the [facility] at [phone] if you have any questions.

## 2024-10-25 ENCOUNTER — TELEPHONE (OUTPATIENT)
Dept: ORTHOPEDIC SURGERY | Facility: HOSPITAL | Age: 54
End: 2024-10-25
Payer: COMMERCIAL

## 2024-10-25 LAB — STAPHYLOCOCCUS SPEC CULT: NORMAL

## 2024-10-25 NOTE — TELEPHONE ENCOUNTER
Thank you for taking my call today.  All questions were answered at the time of the call, but please feel free to reach out to me via [employer] or phone, [phone], with any new questions or concerns.       We confirmed that you opted to enroll in our [id number] program so your discharge prescriptions will be available to take home at the time of discharge.  Please bring any prescription insurance coverage with you on the morning of surgery so that we can enter the information into our system.     We confirmed that your plan would be to Discharge Home same day (Rapid Recovery).    We confirmed that you have DME needed for recovery.     Use the provided body wash for 4 days before surgery and complete a 5th shower on the morning of surgery, this includes your body and hair.  Follow the directions as provided during preadmission testing.  The mouth wash will be used the night before and the morning of surgery.       As a reminder, if you do not hear from our team, please call [phone] between 2pm and 3pm the business day before your surgery to confirm your arrival time and details.        Please don't hesitate to reach out with additional questions or concerns.    Gabbie Pandey MBA, BSN, RN-BC  MICHAEL BarrosoN, RN  [facility]  [facility]  [phone]

## 2024-11-05 NOTE — DISCHARGE INSTRUCTIONS
[facility]                          Gabbie Pandey MBA, BSN, RN-BC Orthopedic  Phone: [phone]    James Mcginnis D.O.  Phone: [phone]    POSTOPERATIVE INSTRUCTIONS: TOTAL HIP & TOTAL KNEE ARTHROPLASTY  General/highlights: Avoid straight leg raise or similar, wear the MAR hose during the daytime for the 14 days, remove at night.  This is more important on your surgical extremity.  Flex/tighten the muscles in the buttocks, thighs and calves often when in chair or bed.  Utilize the incentive spirometer often especially the next 3 days.  Walk at least 10 steps every hour that you are awake.  Take stairs 1 at a time, good leg leads up, bed leg legs down, use the handrails.  You may be weightbearing as tolerated, in general the walker is used for 2 weeks.  New discharge medications: Percocet to be taken as needed for pain.  Baclofen to be taken as needed for muscle spasm.  MiraLAX powder once or twice a day when taking the Percocet to avoid constipation.  Enteric-coated aspirin 81 mg by mouth twice a day for the next 2 weeks, this is your blood thinner and is very important to take.  PAIN, SWELLING & BRUISING  Some pain, stiffness and swelling is normal for up to 1 year after surgery.  Pain will start to let up over time depending on your activity level.  It is preferable to rest for 24 hours following your surgery  Pain is often delayed for 24-48 hours after surgery.  Pain may be dull/achy, throbbing, or even sharp/nerve sensations  It is normal for swelling and bruising to worsen before it gets better.  These symptoms usually peak 1 week after surgery  Swelling and bruising may appear throughout the leg, all the way down to your toes  Wear your compression stockings every day during the day for 14 days and remove them at night.  This will help control swelling    WOUND CARE INSTRUCTIONS  Your surgical bandage will be removed 2 weeks after surgery at your  post-operative visit.  If your bandage becomes compromised, begins to come off before then, or soaks through with drainage call the office immediately.  You may have sutures under the skin that dissolve on their own over time.    As the sutures absorb occasionally a small suture abscess can develop, this is not uncommon for up to 6 weeks, if this occurs please notify your surgeon immediately.    HYGIENE  You may shower 24 hours after your surgery, provided the Mepilex silver dressing is in place.  No tub bathing or submerging underwater.  Do not scrub directly over the surgical bandage  Do not use any creams, lotions or ointments on the surgical leg for 4 weeks after surgery, or until you have been cleared to do so by your surgeon    GENERAL INSTRUCTIONS  Do not drink alcoholic beverages (beer and wine included) for 24 hours following your surgery, or while you are taking narcotic pain medications  Delay making important decisions until you are fully recovered  You cannot swim or submerge in water for at least 6 weeks after surgery, or until you are cleared by your surgeon.  You may start kneeling 3 months after knee replacement surgery, once you have been cleared by your surgeon.  This may not ever feel “normal” or comfortable    HOME DIET  Resume your normal diet after surgery. If you are on a specific type of diet for your condition, resume that instead.    Choose foods that help promote good bowel habits and prevent constipation, such as foods high in fiber.    POSTOPERATIVE MEDICATIONS  Pain medications have been ordered to help manage pain throughout recovery.  While you are using narcotic pain medication, you should be using a stool softener or laxative to prevent constipation.  My preference is parallax powder once or twice a day when taking the narcotic pain medicine  It is important to eat a small meal or snack before taking pain medications to avoid nausea or stomach upset.    MEDICATION REFILLS -  "[phone]  If you need to request a medication refill, please call the office between 8:30am-4:30pm, Monday through Friday.    Any calls received outside of this timeframe will be handled on the next business day.    Medication requests received on Saturday or Sunday will be handled on Monday.    Please allow 3-5 business days for all medication requests to be processed.    RESTARTING HOME MEDICATIONS  You may restart your home medications the following day after your surgery UNLESS you have been given alternate instructions.    Follow the instructions given to you on your hospital discharge instructions for more information regarding your home medications.    ASSISTIVE DEVICE & MOVEMENT  Initially, you will use a walker or crutches to walk for the first 2 weeks.  Once your therapist feels you are ready, you will wean to one crutch or cane followed by no assistive device.  It is important to keep moving throughout recovery.  Walk at least 10 steps every hour that you are awake.  Stairs are part of your recovery, the \"good \"leg leads on the way up, the \"bad \"leg leads on the way down to, use the handrails and not the walker or crutches.  You should be up and walking around several times per day as well as bending your knee and making sure your knee is going completely straight (for knee replacements).  For anterior hip replacements avoid straight leg raise.    NUMBNESS/CLICKING  Decreased sensation or numbness is common on or around the area of the incision due to sensory nerves that are affected at the time of surgery.  The area of numbness will be lateral to the incision  You might always have numbness but the size of the area of numbness should decrease with time.  It is common for patients to have a “click” in the knee with movement.  This is usually nothing to be concerned about.  There are several reasons why your knee can be making these noises, including the implant components rubbing against each other, " or a tendons going over a bony prominence.  Grinding can also occur and is not out of the ordinary.  Grinding can be caused by scar tissue formation  Noises will often settle over time once muscle strength improves.    DIFFICULTY SLEEPING  It is very common for patients to have difficulty sleeping at night which can be caused pain, medication, or feeling of anxiety.  Sleep disturbance typically worsens 4-6 weeks after surgery.  You are permitted to sleep on your back or side with a  pillow between your legs for comfort    DRIVING & TRAVEL  Your surgeon will address this at your post-op appointment.  You must not be taking narcotic pain medication to be cleared to drive.  LEFT LEG JOINT REPLACMENT:  You may drive once you have regained full control of your leg, typically around 2 week after surgery  RIGHT LEG JOINT REPLACEMENT:  You must speak with your surgeon before you resume driving.  Driving can typically be resumed by 4-6 weeks after surgery.  During long distance travel, you should attempt to change position or stand every hour.  You should complete ankle pumps throughout your travel if you are sitting for long periods of time.  If traveling within the first 2 weeks after surgery, you should wear your compression stockings.    DENTAL & OTHER PROCEDURES  All patients must wait a minimum of 3 months for elective procedures, including routine dental cleanings.  For any dental appointment - cleaning or dental procedures - patients must take a prophylactic antibiotic 1 hour before the appointment.  You will also need to call for an antibiotic prior to any other invasive test, procedure, or surgery.  These prophylactic antibiotics will be needed for the rest of your life, in order to prevent infections.  Please call your surgeons office at [phone] to request the antibiotic.      PHYSICAL THERAPY  Following surgery it is important to progress through recovery with in-home or outpatient physical therapy.  You  should continue to complete home exercises provided from the hospital on days that you are not working with a physical therapist.  It is common to have a temporary increase in pain and swelling upon starting outpatient physical therapy and/or changing your exercise routine.  Continue to use ice to help with symptoms.     FOLLOW-UP APPOINTMENT - [phone]  Your post-surgical appointments will take place approximately 2 weeks, 6 weeks, 3 months and 1 year following surgery.  Joint replacements are monitored thereafter every 2-5 years for life.  If you have any questions or need to make changes to this appointment, please contact the office:    EMERGENCIES & WHEN TO CALL YOUR SURGEON  When to contact our office immediately:  Any falls or injury to the joint replacement  Fever >101.5 for at least 48 hours after surgery or chills.  Excessive bleeding from incision(s). A small amount of drainage is normal and expected.  Signs of infection of incision(s)-excessive drainage that is soaking through your dressing (especially if it is pus-like), redness that is spreading out from the edges of your incision, or increased warmth around the area.  Excruciating pain for which the pain medication, taken as instructed, is not helping.  Severe calf pain.  Go directly to the emergency room or call 911, if you are experiencing chest pain or difficulty breathing.    ICE/COLD THERAPY  Ice is most important during the first 2 weeks after surgery, but should be used for several weeks as needed.  Never place ice, or cold therapy devices directly on the skin.  You should always have a protective layer between your skin and the cold.  You have been prescribed to ice your total joint at a minimum of twice per hour for 20 minutes while awake during the first 6 weeks after surgery if you are using ice packs. This will help with pain control.  If you are using an ice machine, please follow ice machine instructions.  After knee replacement is  extremely important to elevate the leg straight on an incline, not flat.    COLD THERAPY MACHINE RECOMMENDATIONS  Cold therapy devices can be used before and after surgery to assist in comfort and help to reduce pain and swelling.  These devices differ from ice or ice packs whereas the mechanism circulates water through tubing and a pad to provide longer periods of cold therapy to the desired site.  While in the hospital, you can use your cold devices around the clock for optimal comfort.  We recommend using cold therapy after working with therapy or completing exercises on your own.  Once you are discharged home, there is no set schedule in which you must follow while using cold therapy.  Below are a few points to remember when using a cold therapy device:    Read the 's instructions prior to first the use.  Follow instructions for filling the cooler (water first, then ice).  Always make sure there is a layer of protection between the cold pad and your skin (Clothing, Towel, Ace Bandage, etc.)  Allow the device to circulate cold water throughout the pad prior wrapping the pad around your leg (approximately 10 minutes).  Place the pad on your leg in the desired position to meet your pain management needs and use the wraps provided to secure the pad to your body.  The purpose of this device is to use consistently throughout the day.  You do not need to need to use the 20 on, 20 off method when using an ice machine.  During waking hours, remove the cold pad every 1-2 hours to perform a skin check  Detach the pad from the cooler and ambulate at least once every hour  After removing the pad, allow at least 30 minutes before resuming cold therapy  You may wear the cold therapy device during periods of sleep including overnight    If you wake up during the night, you can check the skin at this time.  You do not need to wake up specifically to perform skin checks.  Empty the cooler and pad when device is not in  use.  Follow 's instructions for cleaning your cold therapy device.    [facility] can assist with problems related to products purchased through the [facility]  [phone] - [place]

## 2024-11-06 ENCOUNTER — PHARMACY VISIT (OUTPATIENT)
Dept: PHARMACY | Facility: CLINIC | Age: 54
End: 2024-11-06
Payer: MEDICARE

## 2024-11-06 ENCOUNTER — ANESTHESIA EVENT (OUTPATIENT)
Dept: OPERATING ROOM | Facility: HOSPITAL | Age: 54
End: 2024-11-06
Payer: COMMERCIAL

## 2024-11-06 ENCOUNTER — APPOINTMENT (OUTPATIENT)
Dept: RADIOLOGY | Facility: HOSPITAL | Age: 54
End: 2024-11-06
Payer: COMMERCIAL

## 2024-11-06 ENCOUNTER — HOME HEALTH ADMISSION (OUTPATIENT)
Dept: HOME HEALTH SERVICES | Facility: HOME HEALTH | Age: 54
End: 2024-11-06
Payer: COMMERCIAL

## 2024-11-06 ENCOUNTER — HOSPITAL ENCOUNTER (OUTPATIENT)
Facility: HOSPITAL | Age: 54
Setting detail: OUTPATIENT SURGERY
Discharge: HOME | End: 2024-11-06
Attending: ORTHOPAEDIC SURGERY | Admitting: ORTHOPAEDIC SURGERY
Payer: COMMERCIAL

## 2024-11-06 ENCOUNTER — DOCUMENTATION (OUTPATIENT)
Dept: HOME HEALTH SERVICES | Facility: HOME HEALTH | Age: 54
End: 2024-11-06
Payer: COMMERCIAL

## 2024-11-06 ENCOUNTER — ANESTHESIA (OUTPATIENT)
Dept: OPERATING ROOM | Facility: HOSPITAL | Age: 54
End: 2024-11-06
Payer: COMMERCIAL

## 2024-11-06 VITALS
BODY MASS INDEX: 33.78 KG/M2 | SYSTOLIC BLOOD PRESSURE: 136 MMHG | OXYGEN SATURATION: 96 % | TEMPERATURE: 97.5 F | WEIGHT: 263.23 LBS | RESPIRATION RATE: 17 BRPM | HEIGHT: 74 IN | HEART RATE: 86 BPM | DIASTOLIC BLOOD PRESSURE: 92 MMHG

## 2024-11-06 DIAGNOSIS — M16.11 PRIMARY OSTEOARTHRITIS OF RIGHT HIP: Primary | ICD-10-CM

## 2024-11-06 PROBLEM — G47.33 OSA (OBSTRUCTIVE SLEEP APNEA): Status: ACTIVE | Noted: 2024-11-06

## 2024-11-06 PROBLEM — J45.909 ASTHMA: Status: ACTIVE | Noted: 2024-11-06

## 2024-11-06 PROCEDURE — 97162 PT EVAL MOD COMPLEX 30 MIN: CPT | Mod: GP

## 2024-11-06 PROCEDURE — RXMED WILLOW AMBULATORY MEDICATION CHARGE

## 2024-11-06 PROCEDURE — C1713 ANCHOR/SCREW BN/BN,TIS/BN: HCPCS | Performed by: ORTHOPAEDIC SURGERY

## 2024-11-06 PROCEDURE — 2500000005 HC RX 250 GENERAL PHARMACY W/O HCPCS: Performed by: NURSE ANESTHETIST, CERTIFIED REGISTERED

## 2024-11-06 PROCEDURE — 2720000007 HC OR 272 NO HCPCS: Performed by: ORTHOPAEDIC SURGERY

## 2024-11-06 PROCEDURE — 3700000002 HC GENERAL ANESTHESIA TIME - EACH INCREMENTAL 1 MINUTE: Performed by: ORTHOPAEDIC SURGERY

## 2024-11-06 PROCEDURE — 2500000004 HC RX 250 GENERAL PHARMACY W/ HCPCS (ALT 636 FOR OP/ED): Performed by: NURSE ANESTHETIST, CERTIFIED REGISTERED

## 2024-11-06 PROCEDURE — A27130 PR TOTAL HIP ARTHROPLASTY: Performed by: NURSE ANESTHETIST, CERTIFIED REGISTERED

## 2024-11-06 PROCEDURE — C1776 JOINT DEVICE (IMPLANTABLE): HCPCS | Performed by: ORTHOPAEDIC SURGERY

## 2024-11-06 PROCEDURE — 7100000002 HC RECOVERY ROOM TIME - EACH INCREMENTAL 1 MINUTE: Performed by: ORTHOPAEDIC SURGERY

## 2024-11-06 PROCEDURE — 3700000001 HC GENERAL ANESTHESIA TIME - INITIAL BASE CHARGE: Performed by: ORTHOPAEDIC SURGERY

## 2024-11-06 PROCEDURE — 97116 GAIT TRAINING THERAPY: CPT | Mod: GP

## 2024-11-06 PROCEDURE — 2500000004 HC RX 250 GENERAL PHARMACY W/ HCPCS (ALT 636 FOR OP/ED): Performed by: ORTHOPAEDIC SURGERY

## 2024-11-06 PROCEDURE — 97110 THERAPEUTIC EXERCISES: CPT | Mod: GP

## 2024-11-06 PROCEDURE — 2500000004 HC RX 250 GENERAL PHARMACY W/ HCPCS (ALT 636 FOR OP/ED)

## 2024-11-06 PROCEDURE — 3600000017 HC OR TIME - EACH INCREMENTAL 1 MINUTE - PROCEDURE LEVEL SIX: Performed by: ORTHOPAEDIC SURGERY

## 2024-11-06 PROCEDURE — 2500000005 HC RX 250 GENERAL PHARMACY W/O HCPCS: Performed by: ORTHOPAEDIC SURGERY

## 2024-11-06 PROCEDURE — 97530 THERAPEUTIC ACTIVITIES: CPT | Mod: GP

## 2024-11-06 PROCEDURE — 7100000001 HC RECOVERY ROOM TIME - INITIAL BASE CHARGE: Performed by: ORTHOPAEDIC SURGERY

## 2024-11-06 PROCEDURE — A4649 SURGICAL SUPPLIES: HCPCS | Performed by: ORTHOPAEDIC SURGERY

## 2024-11-06 PROCEDURE — 7100000009 HC PHASE TWO TIME - INITIAL BASE CHARGE: Performed by: ORTHOPAEDIC SURGERY

## 2024-11-06 PROCEDURE — 3600000018 HC OR TIME - INITIAL BASE CHARGE - PROCEDURE LEVEL SIX: Performed by: ORTHOPAEDIC SURGERY

## 2024-11-06 PROCEDURE — 7100000010 HC PHASE TWO TIME - EACH INCREMENTAL 1 MINUTE: Performed by: ORTHOPAEDIC SURGERY

## 2024-11-06 PROCEDURE — 2780000003 HC OR 278 NO HCPCS: Performed by: ORTHOPAEDIC SURGERY

## 2024-11-06 PROCEDURE — A27130 PR TOTAL HIP ARTHROPLASTY: Performed by: ANESTHESIOLOGY

## 2024-11-06 DEVICE — CERAMIC V40 FEMORAL HEAD
Type: IMPLANTABLE DEVICE | Site: HIP | Status: FUNCTIONAL
Brand: BIOLOX

## 2024-11-06 DEVICE — TRIDENT II TRITANIUM CLUSTER 54E
Type: IMPLANTABLE DEVICE | Site: HIP | Status: FUNCTIONAL
Brand: TRIDENT II

## 2024-11-06 DEVICE — TRIDENT X3 0 DEGREE POLYETHYLENE INSERT
Type: IMPLANTABLE DEVICE | Site: HIP | Status: FUNCTIONAL
Brand: TRIDENT X3 INSERT

## 2024-11-06 DEVICE — BONE PINS (3.2MM X 140MM): Type: IMPLANTABLE DEVICE | Site: HIP | Status: NON-FUNCTIONAL

## 2024-11-06 DEVICE — 6.5MM LOW PROFILE HEX SCREW 25MM
Type: IMPLANTABLE DEVICE | Site: HIP | Status: FUNCTIONAL
Brand: TRIDENT II

## 2024-11-06 RX ORDER — POLYETHYLENE GLYCOL 3350 17 G/17G
17 POWDER, FOR SOLUTION ORAL DAILY
COMMUNITY
Start: 2024-11-06

## 2024-11-06 RX ORDER — WATER
240 LIQUID (ML) MISCELLANEOUS
Status: DISCONTINUED | OUTPATIENT
Start: 2024-11-06 | End: 2024-11-06 | Stop reason: HOSPADM

## 2024-11-06 RX ORDER — LIDOCAINE HYDROCHLORIDE 10 MG/ML
INJECTION, SOLUTION EPIDURAL; INFILTRATION; INTRACAUDAL; PERINEURAL AS NEEDED
Status: DISCONTINUED | OUTPATIENT
Start: 2024-11-06 | End: 2024-11-06

## 2024-11-06 RX ORDER — FENTANYL CITRATE 50 UG/ML
50 INJECTION, SOLUTION INTRAMUSCULAR; INTRAVENOUS EVERY 5 MIN PRN
Status: DISCONTINUED | OUTPATIENT
Start: 2024-11-06 | End: 2024-11-06 | Stop reason: HOSPADM

## 2024-11-06 RX ORDER — LIDOCAINE HYDROCHLORIDE 10 MG/ML
0.1 INJECTION, SOLUTION EPIDURAL; INFILTRATION; INTRACAUDAL; PERINEURAL ONCE
Status: DISCONTINUED | OUTPATIENT
Start: 2024-11-06 | End: 2024-11-06 | Stop reason: HOSPADM

## 2024-11-06 RX ORDER — ONDANSETRON HYDROCHLORIDE 2 MG/ML
4 INJECTION, SOLUTION INTRAVENOUS EVERY 8 HOURS PRN
Status: DISCONTINUED | OUTPATIENT
Start: 2024-11-06 | End: 2024-11-06 | Stop reason: HOSPADM

## 2024-11-06 RX ORDER — DEXTROMETHORPHAN HYDROBROMIDE, GUAIFENESIN 5; 100 MG/5ML; MG/5ML
650 LIQUID ORAL EVERY 8 HOURS PRN
COMMUNITY

## 2024-11-06 RX ORDER — ACETAMINOPHEN 325 MG/1
650 TABLET ORAL EVERY 6 HOURS SCHEDULED
Status: DISCONTINUED | OUTPATIENT
Start: 2024-11-06 | End: 2024-11-06 | Stop reason: HOSPADM

## 2024-11-06 RX ORDER — FENTANYL CITRATE 50 UG/ML
INJECTION, SOLUTION INTRAMUSCULAR; INTRAVENOUS AS NEEDED
Status: DISCONTINUED | OUTPATIENT
Start: 2024-11-06 | End: 2024-11-06

## 2024-11-06 RX ORDER — ROPIVACAINE/EPI/CLONIDINE/KET 2.46-0.005
SYRINGE (ML) INJECTION AS NEEDED
Status: DISCONTINUED | OUTPATIENT
Start: 2024-11-06 | End: 2024-11-06 | Stop reason: HOSPADM

## 2024-11-06 RX ORDER — LABETALOL HYDROCHLORIDE 5 MG/ML
5 INJECTION, SOLUTION INTRAVENOUS ONCE AS NEEDED
Status: DISCONTINUED | OUTPATIENT
Start: 2024-11-06 | End: 2024-11-06 | Stop reason: HOSPADM

## 2024-11-06 RX ORDER — MIDAZOLAM HYDROCHLORIDE 1 MG/ML
INJECTION, SOLUTION INTRAMUSCULAR; INTRAVENOUS AS NEEDED
Status: DISCONTINUED | OUTPATIENT
Start: 2024-11-06 | End: 2024-11-06

## 2024-11-06 RX ORDER — ONDANSETRON HYDROCHLORIDE 2 MG/ML
4 INJECTION, SOLUTION INTRAVENOUS ONCE AS NEEDED
Status: DISCONTINUED | OUTPATIENT
Start: 2024-11-06 | End: 2024-11-06 | Stop reason: HOSPADM

## 2024-11-06 RX ORDER — OXYCODONE HYDROCHLORIDE 5 MG/1
10 TABLET ORAL EVERY 4 HOURS PRN
Status: DISCONTINUED | OUTPATIENT
Start: 2024-11-06 | End: 2024-11-06 | Stop reason: HOSPADM

## 2024-11-06 RX ORDER — POLYETHYLENE GLYCOL 3350 17 G/17G
17 POWDER, FOR SOLUTION ORAL DAILY
Status: DISCONTINUED | OUTPATIENT
Start: 2024-11-07 | End: 2024-11-06 | Stop reason: HOSPADM

## 2024-11-06 RX ORDER — NALOXONE HYDROCHLORIDE 0.4 MG/ML
0.2 INJECTION, SOLUTION INTRAMUSCULAR; INTRAVENOUS; SUBCUTANEOUS EVERY 5 MIN PRN
Status: DISCONTINUED | OUTPATIENT
Start: 2024-11-06 | End: 2024-11-06 | Stop reason: HOSPADM

## 2024-11-06 RX ORDER — TRANEXAMIC ACID 100 MG/ML
INJECTION, SOLUTION INTRAVENOUS AS NEEDED
Status: DISCONTINUED | OUTPATIENT
Start: 2024-11-06 | End: 2024-11-06

## 2024-11-06 RX ORDER — ONDANSETRON 4 MG/1
4 TABLET, ORALLY DISINTEGRATING ORAL EVERY 8 HOURS PRN
Status: DISCONTINUED | OUTPATIENT
Start: 2024-11-06 | End: 2024-11-06 | Stop reason: HOSPADM

## 2024-11-06 RX ORDER — ASPIRIN 81 MG/1
81 TABLET ORAL 2 TIMES DAILY
COMMUNITY
Start: 2024-11-06 | End: 2024-11-20

## 2024-11-06 RX ORDER — CEFAZOLIN SODIUM 2 G/100ML
2 INJECTION, SOLUTION INTRAVENOUS EVERY 8 HOURS
Status: DISCONTINUED | OUTPATIENT
Start: 2024-11-06 | End: 2024-11-06 | Stop reason: HOSPADM

## 2024-11-06 RX ORDER — HYDROMORPHONE HYDROCHLORIDE 2 MG/ML
INJECTION, SOLUTION INTRAMUSCULAR; INTRAVENOUS; SUBCUTANEOUS AS NEEDED
Status: DISCONTINUED | OUTPATIENT
Start: 2024-11-06 | End: 2024-11-06

## 2024-11-06 RX ORDER — SODIUM CHLORIDE, SODIUM LACTATE, POTASSIUM CHLORIDE, CALCIUM CHLORIDE 600; 310; 30; 20 MG/100ML; MG/100ML; MG/100ML; MG/100ML
20 INJECTION, SOLUTION INTRAVENOUS CONTINUOUS
Status: DISCONTINUED | OUTPATIENT
Start: 2024-11-06 | End: 2024-11-06 | Stop reason: HOSPADM

## 2024-11-06 RX ORDER — MEPERIDINE HYDROCHLORIDE 25 MG/ML
12.5 INJECTION INTRAMUSCULAR; INTRAVENOUS; SUBCUTANEOUS EVERY 10 MIN PRN
Status: DISCONTINUED | OUTPATIENT
Start: 2024-11-06 | End: 2024-11-06 | Stop reason: HOSPADM

## 2024-11-06 RX ORDER — CEFAZOLIN SODIUM IN 0.9 % NACL 3 G/100 ML
3 INTRAVENOUS SOLUTION, PIGGYBACK (ML) INTRAVENOUS ONCE
Status: COMPLETED | OUTPATIENT
Start: 2024-11-06 | End: 2024-11-06

## 2024-11-06 RX ORDER — VANCOMYCIN HYDROCHLORIDE 1 G/20ML
INJECTION, POWDER, LYOPHILIZED, FOR SOLUTION INTRAVENOUS AS NEEDED
Status: DISCONTINUED | OUTPATIENT
Start: 2024-11-06 | End: 2024-11-06 | Stop reason: HOSPADM

## 2024-11-06 RX ORDER — ASPIRIN 81 MG/1
81 TABLET ORAL 2 TIMES DAILY
Status: DISCONTINUED | OUTPATIENT
Start: 2024-11-06 | End: 2024-11-06 | Stop reason: HOSPADM

## 2024-11-06 RX ORDER — DIPHENHYDRAMINE HCL 25 MG
12.5 TABLET ORAL EVERY 6 HOURS PRN
Status: DISCONTINUED | OUTPATIENT
Start: 2024-11-06 | End: 2024-11-06 | Stop reason: HOSPADM

## 2024-11-06 RX ORDER — CEFAZOLIN SODIUM 2 G/100ML
2 INJECTION, SOLUTION INTRAVENOUS ONCE
Status: DISCONTINUED | OUTPATIENT
Start: 2024-11-06 | End: 2024-11-06

## 2024-11-06 RX ORDER — ONDANSETRON HYDROCHLORIDE 2 MG/ML
INJECTION, SOLUTION INTRAVENOUS AS NEEDED
Status: DISCONTINUED | OUTPATIENT
Start: 2024-11-06 | End: 2024-11-06

## 2024-11-06 RX ORDER — PROPOFOL 10 MG/ML
INJECTION, EMULSION INTRAVENOUS CONTINUOUS PRN
Status: DISCONTINUED | OUTPATIENT
Start: 2024-11-06 | End: 2024-11-06

## 2024-11-06 RX ORDER — ROCURONIUM BROMIDE 10 MG/ML
INJECTION, SOLUTION INTRAVENOUS AS NEEDED
Status: DISCONTINUED | OUTPATIENT
Start: 2024-11-06 | End: 2024-11-06

## 2024-11-06 RX ORDER — KETOROLAC TROMETHAMINE 30 MG/ML
30 INJECTION, SOLUTION INTRAMUSCULAR; INTRAVENOUS EVERY 6 HOURS
Status: DISCONTINUED | OUTPATIENT
Start: 2024-11-06 | End: 2024-11-06 | Stop reason: HOSPADM

## 2024-11-06 RX ORDER — OXYCODONE HYDROCHLORIDE 5 MG/1
5 TABLET ORAL EVERY 6 HOURS PRN
Status: DISCONTINUED | OUTPATIENT
Start: 2024-11-06 | End: 2024-11-06 | Stop reason: HOSPADM

## 2024-11-06 RX ORDER — OXYCODONE HYDROCHLORIDE 5 MG/1
5 TABLET ORAL EVERY 4 HOURS PRN
Status: DISCONTINUED | OUTPATIENT
Start: 2024-11-06 | End: 2024-11-06 | Stop reason: HOSPADM

## 2024-11-06 RX ORDER — FENTANYL CITRATE 50 UG/ML
25 INJECTION, SOLUTION INTRAMUSCULAR; INTRAVENOUS EVERY 5 MIN PRN
Status: DISCONTINUED | OUTPATIENT
Start: 2024-11-06 | End: 2024-11-06 | Stop reason: HOSPADM

## 2024-11-06 RX ORDER — OXYCODONE AND ACETAMINOPHEN 5; 325 MG/1; MG/1
0.5 TABLET ORAL EVERY 4 HOURS PRN
Status: DISCONTINUED | OUTPATIENT
Start: 2024-11-06 | End: 2024-11-06 | Stop reason: HOSPADM

## 2024-11-06 ASSESSMENT — PAIN - FUNCTIONAL ASSESSMENT
PAIN_FUNCTIONAL_ASSESSMENT: 0-10
PAIN_FUNCTIONAL_ASSESSMENT: FLACC (FACE, LEGS, ACTIVITY, CRY, CONSOLABILITY)
PAIN_FUNCTIONAL_ASSESSMENT: 0-10

## 2024-11-06 ASSESSMENT — COGNITIVE AND FUNCTIONAL STATUS - GENERAL
WALKING IN HOSPITAL ROOM: A LITTLE
MOBILITY SCORE: 22
CLIMB 3 TO 5 STEPS WITH RAILING: A LITTLE

## 2024-11-06 ASSESSMENT — COLUMBIA-SUICIDE SEVERITY RATING SCALE - C-SSRS
2. HAVE YOU ACTUALLY HAD ANY THOUGHTS OF KILLING YOURSELF?: NO
2. HAVE YOU ACTUALLY HAD ANY THOUGHTS OF KILLING YOURSELF?: NO
1. IN THE PAST MONTH, HAVE YOU WISHED YOU WERE DEAD OR WISHED YOU COULD GO TO SLEEP AND NOT WAKE UP?: NO
1. IN THE PAST MONTH, HAVE YOU WISHED YOU WERE DEAD OR WISHED YOU COULD GO TO SLEEP AND NOT WAKE UP?: NO

## 2024-11-06 ASSESSMENT — PAIN DESCRIPTION - LOCATION: LOCATION: HIP

## 2024-11-06 ASSESSMENT — PAIN SCALES - GENERAL
PAINLEVEL_OUTOF10: 2
PAINLEVEL_OUTOF10: 0 - NO PAIN
PAINLEVEL_OUTOF10: 1
PAINLEVEL_OUTOF10: 0 - NO PAIN
PAINLEVEL_OUTOF10: 2
PAINLEVEL_OUTOF10: 2

## 2024-11-06 ASSESSMENT — ACTIVITIES OF DAILY LIVING (ADL)
ADL_ASSISTANCE: INDEPENDENT
ADLS_ADDRESSED: YES

## 2024-11-06 ASSESSMENT — PAIN DESCRIPTION - ORIENTATION: ORIENTATION: RIGHT

## 2024-11-06 NOTE — ANESTHESIA PREPROCEDURE EVALUATION
"Patient: Claudy Ascencio \"Sumanth\"    Procedure Information       Date/Time: 11/06/24 0939    Procedure: Arthroplasty Total Hip Anterior Approach (GIOVANNI ROBOTIC ASSISTED DEVICE,  AQUAMANTYS, C-ARM, SHARAD INSIGNIA VS ACCOLADE II, TRIDENT JEREMIAH, SHARAD ANT HIP RETRACTORS, FLEXIBLE REAMERS AVAILABLE) ** Rapid Recovery ** (Right: Hip)    Location: [facility] / Virtual [facility]    Surgeons: James Mcginnis DO          Past Medical History:   Diagnosis Date    Asthma 1977    No issues for a long time    CPAP (continuous positive airway pressure) dependence 2022    On cpap for 2 years    Hearing aid worn     7 years    HL (hearing loss)     Long time    Joint pain     Several years        Relevant Problems   Pulmonary   (+) Asthma   (+) KYRIE (obstructive sleep apnea)       Clinical information reviewed:   Tobacco  Allergies  Meds   Med Hx  Surg Hx   Fam Hx  Soc Hx        NPO Detail:  NPO/Void Status  Time of Last Void: 0843         Physical Exam    Airway  Mallampati: I  TM distance: >3 FB  Neck ROM: full     Cardiovascular    Dental    Pulmonary    Abdominal            Anesthesia Plan    History of general anesthesia?: yes  History of complications of general anesthesia?: no    ASA 2     general     intravenous induction   Anesthetic plan and risks discussed with patient.    Plan discussed with CRNA.      "

## 2024-11-06 NOTE — PROGRESS NOTES
Met with Patient and Care Partner at bedside- Patient is s/p Right Anterior Hip Replacement with Dr. James Mcginnis.  Discussion with patient included education on the following topics: TJR Education: Wound Care (Bandage Care & Removal, Personal Hygiene & Infection Prevention), Post-Op Activity (Home PT Regimen, Movement Precautions, Assistive Equipment & 1-2hr Movement), Post-Op Precautions (Falls, Blood Clots & Constipation), Cold-Therapy (Ice vs. Cold Therapy Machines) , Methods for Symptom Management (Pain, Nausea, Swelling & Constipation), Importance of Post-op Prescriptions, How to Obtain Medication Refills, When to Resume Driving & Who to Call, Use of [employer] Staff Contact Information, When to call 9-1-1, and When to call the Surgeon's Office.  Patient Did Complete and Virtual Recording class prior to surgery.  Patient is able to verbalize understanding of class content/post-operative discussion.  Contact information was provided to patient for support and assistance during the post-operative period.    At the time of this discussion, the patient's plan includes:    Discharge Date/Disposition:  Home, Today with, Home Care Services  Discharge Needs: No Equipment Needs Identified  Medications/Pharmacy: [email or web address] service utilized for discharge prescriptions through [facility]

## 2024-11-06 NOTE — HH CARE COORDINATION
Home Care received a Referral for Physical Therapy. We have processed the referral for a Start of Care on 11/7.     If you have any questions or concerns, please feel free to contact us at [phone]. Follow the prompts, enter your five digit zip code, and you will be directed to your care team on [facility].

## 2024-11-06 NOTE — DISCHARGE INSTR - ACTIVITY
Patient Education: Knee Immobilizer   The purpose of wearing the knee immobilizer is to stabilize the knee joint to prevent buckling in the presence of quad (thigh muscle) weakness.  The brace will help prevent falls and support the knee during ambulation and stairs.  Please read and follow the instructions carefully to maintain your safety.    Be sure to walk every 1-2 hours while awake at home.     You may walk without the brace after home physical therapy assesses you and deems that you are safe to move without it. Please wear the brace at all times when up, standing, and/or moving. You may remove when sitting or laying to complete your exercises.    IF STILL EXPERIENCING muscle weakness/buckling after the instructed use time, call your surgeon. Keep using the brace until you meet with physical therapist or surgeon.       Why is an immobilizer needed?  Anesthesia (spinal or nerve block) may cause quad weakness that can last for several hours causing knee buckling or knee hyperextension when bearing weight on the leg.  Some patients metabolize medication more slowly than others which may lead to longer lasting anesthesia and prolonged quad weakness.   When should I wear the immobilizer?  Any time you are up walking/completing stairs/standing up/ weight bearing  Wear the immobilizer for the period of time listed above   Remove the brace during periods of rest and personal hygiene  How do I put on the immobilizer?   The larger end of the brace should be at the top of the leg with the smaller end at the ankle  Position the brace with the black fabric metal liners along the inner and outer sides of the knee, the hard lining is also touching back of knee   The opening on the front of the immobilizer should surround the knee joint   Securely Velcro all straps, ensuring they are tight enough to keep the brace in place on the leg  Ensure the immobilizer is not causing numbness/tingling/limited blood flow to the foot/  lower leg  Safety Considerations  You must continue to use your prescribed walker, in addition to the immobilizer, when up and moving  Make sure the immobilizer is applied correctly before getting up to move  Make sure you stand up/ sit down with walker in front of you while wearing the brace

## 2024-11-06 NOTE — ANESTHESIA PROCEDURE NOTES
Airway  Date/Time: 11/6/2024 10:18 AM  Urgency: elective    Airway not difficult    Staffing  Performed: CRNA   Authorized by: Noel Randle MD    Performed by: TANIKA Hess-CRNA, RENITA  Patient location during procedure: OR    Indications and Patient Condition  Indications for airway management: anesthesia and airway protection  Spontaneous ventilation: present  Sedation level: deep  Preoxygenated: yes  Patient position: sniffing  Mask difficulty assessment: 2 - vent by mask + OA or adjuvant +/- NMBA    Final Airway Details  Final airway type: endotracheal airway      Successful airway: ETT  Cuffed: yes   Successful intubation technique: direct laryngoscopy  Facilitating devices/methods: intubating stylet  Endotracheal tube insertion site: oral  Blade: Villalobos  Blade size: #2  ETT size (mm): 7.5  Cormack-Lehane Classification: grade IIa - partial view of glottis  Placement verified by: capnometry   Cuff volume (mL): 7  Measured from: lips  ETT to lips (cm): 23  Number of attempts at approach: 1

## 2024-11-06 NOTE — NURSING NOTE
Patient has eaten.    Respiratory therapy has completed IS education with patient.    Physical therapy has completed assessment and education; patient has functionally cleared for discharge.  Patient has voided.     has seen patient and home needs are arranged.     no complaint of pain.    no complaint of nausea.     Family at bedside; discussed discharge instructions with patient and Family. All questions at this time answered.     Pharmacy has delivered patient discharge medications.     Patient has completed requirements for discharge from [facility].      Patient clinically appropriate for discharge. IV removed and patient transported to discharge area via wheelchair.

## 2024-11-06 NOTE — OP NOTE
"Arthroplasty Total Hip Anterior Approach (GIOVANNI ROBOTIC ASSISTED DEVICE,  AQUAMANTYS, C-ARM, SHARAD INSIGNIA VS ACCOLADE II, TRIDENT JEREMIAH, SHARAD ANT HIP RETRACTORS, FLEXIBLE REAMERS AVAILABLE) ** Rapid Recovery ** (R) Operative Note     Date: 2024  OR Location: [facility]    Name: Claudy Ascencio \"Sumanth\", : 1970, Age: 54 y.o., MRN: [id number], Sex: male    Diagnosis  Pre-op Diagnosis      * Primary osteoarthritis of right hip [M16.11] Post-op Diagnosis     * Primary osteoarthritis of right hip [M16.11]     Procedures  Arthroplasty Total Hip Anterior Approach (GIOVANNI ROBOTIC ASSISTED DEVICE,  AQUAMANTYS, C-ARM, SHARAD INSIGNIA VS ACCOLADE II, TRIDENT JEREMIAH, SHARAD ANT HIP RETRACTORS, FLEXIBLE REAMERS AVAILABLE) ** Rapid Recovery **  33990 - LA ARTHRP ACETBLR/PROX FEM PROSTC AGRFT/ALGRFT      Surgeons      * James Mcginnis - Primary    Resident/Fellow/Other Assistant:  Surgeons and Role:  * No surgeons found with a matching role *    Staff:   Circulator: Kavitha Joyaub Person: Aarti Joyaub Person: Monique Joyaub Person: Lexy Gómez Circulator: Angelique    Anesthesia Staff: Anesthesiologist: Noel Randle MD  CRNA: TANIKA Hess-IVAN, RENITA    Procedure Summary  Anesthesia: General  ASA: II  Estimated Blood Loss: 300 mL  Intra-op Medications:   Administrations occurring from 0945 to 1235 on 24:   Medication Name Total Dose   ropivacaine-epinephrine-clonidine-ketorolac 2.46-0.005- 0.0008-0.3mg/mL periarticular syringe 50 mL   vancomycin (Vancocin) vial for injection 1 g   dexAMETHasone (Decadron) injection 4 mg/mL 10 mg   fentaNYL (Sublimaze) injection 50 mcg/mL 200 mcg   HYDROmorphone (Dilaudid) injection 2 mg/mL 2 mg   lactated Ringer's infusion Cannot be calculated   lidocaine PF (Xylocaine-MPF) local injection 1 % 5 mL   midazolam (Versed) injection 1 mg/mL 2 mg   ondansetron (Zofran) 2 mg/mL injection 4 mg   propofol (Diprivan) injection 10 mg/mL 994.16 mg   rocuronium (ZeMuron) 50 mg/5 mL " "injection 125 mg   sugammadex (Bridion) 200 mg/2 mL injection 200 mg   tranexamic acid (Cyklokapron) injection 2,000 mg   ceFAZolin (Ancef) 3 g in sodium chloride 0.9%  mL 3 g              Anesthesia Record               Intraprocedure I/O Totals          Intake    Tranexamic Acid 0.00 mL    The total shown is the total volume documented since Anesthesia Start was filed.    lactated Ringer's infusion 1000.00 mL    Total Intake 1000 mL          Specimen: No specimens collected              Drains and/or Catheters: * None in log *    Tourniquet Times:         Implants:  Implants       Type Name Action Serial No.       3.2MM X 140MM SHARAD BONE PIN Used, Not Implanted       3.2MM X 140MM SHARAD BONE PIN Used, Not Implanted      Joint SHELL, TRIDENT II, CLUSTERHOLE, 54E - [id number] Implanted      Joint INSERT, TRIDENT X3 POLYETHYLENE, 0 DEG, 36MM E - [id number] Implanted      Screw SCREW, LOW PROFILE HEX, 6.5 X 25 MM - [id number] Implanted       SIZE 3, INSIGNIA HIP STEM, HIGH OFFSET Implanted      Joint HEAD, FEMUR V40 36MM 0MM BIOLOX DELTA - [id number] Implanted               Findings: Dissection down to the hip severe arthrosis was encountered.  Preoperatively leg length was slightly short by a few millimeters on the right.  Patient has known arthrosis of his left hip and the right side was lengthened by an additional few millimeters.  After placement of a well-fixed total hip arthroplasty excellent range of motion, excellent stability at the arc of motion center rotation and offset reestablished.    Indications: Claudy Ascencio \"Sumanth\" is an 54 y.o. male who is having surgery for Primary osteoarthritis of right hip [M16.11].  Robotic assisted right total hip arthroplasty, direct anterior approach    The patient was seen in the preoperative area. The risks, benefits, complications, treatment options, non-operative alternatives, expected recovery and outcomes were discussed with the patient. The " possibilities of reaction to medication, pulmonary aspiration, injury to surrounding structures, bleeding, recurrent infection, the need for additional procedures, failure to diagnose a condition, and creating a complication requiring transfusion or operation were discussed with the patient. The patient concurred with the proposed plan, giving informed consent.  The site of surgery was properly noted/marked if necessary per policy. The patient has been actively warmed in preoperative area. Preoperative antibiotics have been ordered and given within 1 hours of incision. Venous thrombosis prophylaxis are not indicated.    Procedure Details: The patient was correctly identified in preoperative holding, taken to the operative suite and placed in the seated position, a spinal anesthetic was administered.  Appropriate antibiotics and intravenous TXA administered.  The patient was placed in the supine position.  The bilateral lower extremities including the bilateral hips and abdomen were sterilely prepped and draped in the usual surgical fashion using an EZYsuit.  The pelvic array was affixed to the right iliac crest with 3 pins.  A 10 cm longitudinal incision was made starting 2 fingerbreadths distal and lateral to the ASIS descending in line with the lateral epicondyle of the femur.  Dissection was carried through the subcutaneous tissue to the investing fascia of the tensor fascia dayan.  The fascia of the TFL was split over the muscle belly.  The muscle belly was then swept laterally allowing access to the interval.  The ascending branches of the lateral femoral circumflex artery and vein were then identified and coagulated with electrocautery.  The prerectal fascia was incised, pericapsular fat excised.  Extracapsular retractors were then placed.  A T-shaped capsulotomy was then performed, the upper and lower leaflets were tagged with #2 FiberWire suture. Initial proximal release was carried up through the saddle,  distal release to the pubofemoral ligament.  The digital ruler was then utilized to assess the neck cut.  The femoral neck cut was made.  The head and neck segments were then removed.  Periacetabular retractors were placed.  Acetabular registration was then performed with excellent accuracy.  Reaming was then performed, excellent depth and rim ream obtained with a 54 meter reamer.  The acetabulum was soaked with Irrisept and thoroughly irrigated.  The final 54 millimeter acetabular cup was then placed in 39 degrees of abduction and 20 degrees of anteversion with excellent fitment to the native acetabulum, position confirmed with the Raymond.  Remaining periacetabular osteophytes resected.  A 25 mm posterior superior cancellous acetabular bone screw was placed with excellent purchase.  A neutral 36 mm polyethylene liner was then affixed to the shell.  The iliac crest array was then removed.  Attention was then turned to the femur.  Sequential release was performed of the ischiofemoral ligament to allow lateralization and elevation of the femur.  A cookie cutter was utilized to open up the canal, canal finder sounded the canal, stepwise impaction broaching was then performed and carried up to a size 3 broach.  Trialing was then performed.  Best fit, stability, leg length and offset were achieved with a high offset trunnion and a 36 x +0 mm trial femoral head.  Fluoroscopic images were taken and compared to the contralateral side.  The trials were then removed.  The wound was soaked with Irrisept and thoroughly irrigated.  The final size 3 high offset femoral stem was impacted into the femur.  The 36 x +0 mm femoral head was impacted onto the mortise taper and the hip was reduced.  Final fluoroscopic images were taken.  The hip was thoroughly irrigated and soaked with Irrisept once again.  Vancomycin powder was placed in the deep wound as the capsule was closed at an hour and 50 minutes of surgical time from incision.   The pin holes were closed with 4-0 Monocryl, Steri-Strips and covered with a Mepilex dressing.  The wound was then thoroughly irrigated, the capsule reapproximated with #1 Vicryl suture, the fascia dayan was closed with #1 Vicryl suture with a watertight closure.  The skin was closed in typical layered fashion, skin edge reapproximated with skin glue, Steri-Strips with adhesive and a Mepilex Ag dressing was applied.  The patient was subsequently lightened from anesthesia, transferred to the spinal bed and taken to the recovery room in stable condition without complication  Complications:  None; patient tolerated the procedure well.    Disposition: PACU - hemodynamically stable.  Condition: stable                 Additional Details: craig    Attending Attestation:     James Mcginnis  Phone Number: [phone]

## 2024-11-06 NOTE — PROGRESS NOTES
Medication Education     Medication education for Claudy Ascencio was provided to the patient and family for the following medication(s):  Baclofen  Percocet    Medication education provided by a Pharmacist:  -Proper dose, indication, possible ADRs   -How the medication works and benefits of taking it   -Importance of compliance   -Potential duration of therapy    Identified potential barriers to education:  None    Method(s) of Education:  Verbal Written materials provided and reviewed    An opportunity to ask questions and receive answers was provided.     Assessment of understanding the patient and family:  2= meets goals/outcomes    Additional Notes (if applicable): Meds to beds given to patient and family.    Luz Bryant, PharmD

## 2024-11-06 NOTE — NURSING NOTE
Patient to room 17 for RR. Team notified of patient arrival.      Bedside handoff report done in PACU. Patient transported via cart to room.      Plan of day discussed with patient and family; all questions answered at this time. Patient and family verbalized understanding that patient is not to ambulate without RN at bedside. Bed locked and lowered, call light in reach. Patient safety maintained.     Orders reviewed and released.

## 2024-11-06 NOTE — PERIOPERATIVE NURSING NOTE
Patient alert and oriented. Denies pain at this time. Tolerating PO fluids with no issues of nausea/vomiting. VS stable.     Dr. Mcginnis at bedside to assess patient.

## 2024-11-06 NOTE — PROGRESS NOTES
TCC met with patient at the bedside to discuss discharge plan.  54 yr old male admitted RR following right hip replacement with Dr. Mcginnis.  Post op follow up confirmed on 11/21/24 at 1:30 PM-Zunilda  Spouse to transport home and assist with care as needed.   TCC informed [facility] to contact spouse for scheduling at [place] [phone]     11/06/24 0916   Discharge Planning   Living Arrangements Spouse/significant other   Support Systems Spouse/significant other   Assistance Needed transportation   Type of Residence Private residence   Number of Stairs to Enter Residence 2   Number of Stairs Within Residence 0   Do you have animals or pets at home? No   Who is requesting discharge planning? Provider   Home or Post Acute Services In home services   Type of Home Care Services Home PT;Home OT   Expected Discharge Disposition Home H

## 2024-11-06 NOTE — ANESTHESIA POSTPROCEDURE EVALUATION
"Patient: Claudy Ascencio \"Sumanth\"    Procedure Summary       Date: 11/06/24 Room / Location: [facility] 08 / [facility]    Anesthesia Start: 1011 Anesthesia Stop: 1312    Procedure: Arthroplasty Total Hip Anterior Approach (GIOVANNI ROBOTIC ASSISTED DEVICE,  AQUAMANTYS, C-ARM, SHARAD INSIGNIA VS ACCOLADE II, TRIDENT JEREMIAH, SHARAD ANT HIP RETRACTORS, FLEXIBLE REAMERS AVAILABLE) ** Rapid Recovery ** (Right: Hip) Diagnosis:       Primary osteoarthritis of right hip      (Primary osteoarthritis of right hip [M16.11])    Surgeons: James Mcginnis DO Responsible Provider: Noel Randle MD    Anesthesia Type: general ASA Status: 2            Anesthesia Type: general    Vitals Value Taken Time   /77 11/06/24 1305   Temp 36.2 °C (97.2 °F) 11/06/24 1305   Pulse 76 11/06/24 1305   Resp 12 11/06/24 1305   SpO2 96 % 11/06/24 1305       Anesthesia Post Evaluation    Patient location during evaluation: PACU  Patient participation: waiting for patient participation  Level of consciousness: sedated  Pain management: adequate  Airway patency: patent  Cardiovascular status: acceptable  Respiratory status: spontaneous ventilation, acceptable, face mask and oral airway  Hydration status: acceptable  Postoperative Nausea and Vomiting: none    There were no known notable events for this encounter.    "

## 2024-11-07 ENCOUNTER — HOME CARE VISIT (OUTPATIENT)
Dept: HOME HEALTH SERVICES | Facility: HOME HEALTH | Age: 54
End: 2024-11-07
Payer: COMMERCIAL

## 2024-11-07 PROCEDURE — G0151 HHCP-SERV OF PT,EA 15 MIN: HCPCS

## 2024-11-07 NOTE — SIGNIFICANT EVENT
Thank you for taking my call today regarding your recent joint replacement surgery with Dr. James Mcginnis.      We discussed that: Home Health Care services (physical and/or occupational therapy) have been initiated Your pain is Controlled on the current regimen Will fluctuate throughout recovery with increased activity You are able to tolerate regular activity and exercises The importance of continued cold therapy throughout recovery The importance of following the prescribed precautions by your surgeon You have had a bowel movement The importance of continuing blood thinner as prescribed The importance of wearing compression stockings as prescribed    You indicated that all of your questions have been answered at the time of our call.    Please don't hesitate to reach out if you have any additional questions or concerns.    Gabbie Pandey MBA, BSN, RN-BC  Orthopedic Program Navigator  [facility]   [phone]

## 2024-11-07 NOTE — PROGRESS NOTES
Physical Therapy Evaluation & Treatment    Patient Name: Sumanth Ascencio  MRN: [id number]  Department:   Room: [facility]  Today's Date: 11/6/2024   Time Calculation  Start Time: 1533  Stop Time: 1633  Time Calculation (min): 60 min    Assessment/Plan   PT Assessment  PT Assessment Results: Decreased strength, Decreased range of motion, Decreased endurance, Impaired balance, Decreased mobility, Orthopedic restrictions, Pain, Obesity, Impaired tone  Rehab Prognosis: Good  Barriers to Discharge: R knee buckling  Evaluation/Treatment Tolerance: Patient tolerated treatment well  Medical Staff Made Aware: Yes  End of Session Communication: Bedside nurse  Assessment Comment: Pt presents with impaired functional mobility s/p R THR with intermittent R knee buckling. PT communicated with surgeon and it was deemed safest to issue patient RLE knee immobilizer for safe home going. Pt was instructed in use of immobilizer at all times when standing or mobilizing. Pt and spouse verbalized understanding. Recommend discharge home with 24 hour supervision/intermittent assistance and home health PT. Pt was issued HEP handout. Pt verbalized and demonstrated understanding.   End of Session Patient Position: Up in chair, BLE elevated, ice to surgical site, call light in reach, needs met, RN aware.  IP OR SWING BED PT PLAN  Inpatient or Swing Bed: Inpatient  PT Plan  Treatment/Interventions: Bed mobility, Transfer training, Stair training, Balance training, Gait training, Strengthening, Endurance training, Range of motion, Therapeutic exercise, Therapeutic activity, Home exercise program, Positioning, Postural re-education  PT Plan: Ongoing PT  PT Frequency: BID  PT Discharge Recommendations: Low intensity level of continued care (24 hour supervision)  Equipment Recommended upon Discharge: Wheeled walker (RLE knee immobilizer)  PT Recommended Transfer Status: Assistive device, Stand by assist  PT - OK to Discharge: Yes      Subjective      General Visit Information:  General  Reason for Referral: R THR  Referred By: Dr. Mcginnis  Past Medical History Relevant to Rehab: hearing aides, asthma, OA, KYRIE  Family/Caregiver Present: Yes (spouse)  Prior to Session Communication: Bedside nurse  Patient Position Received: Bed, 2 rail up  General Comment: R anterior hip post-op dressing dry and intact.     Home Living:  Home Living  Type of Home: House  Lives With: Spouse  Home Adaptive Equipment: Walker rolling or standard  Home Layout: One level  Home Access: Stairs to enter with rails  Entrance Stairs-Rails: Right  Entrance Stairs-Number of Steps: 3  Prior Level of Function:  Prior Function Per Pt/Caregiver Report  Level of [place]: Independent with ADLs and functional transfers, Independent with homemaking with ambulation  ADL Assistance: Independent  Homemaking Assistance: Independent  Ambulatory Assistance: Independent  Vocational: Full time employment ()  Prior Function Comments: Pt denies falls prior to admission.  Precautions:  Precautions  LE Weight Bearing Status: Weight Bearing as Tolerated  Medical Precautions: Fall precautions  Post-Surgical Precautions: Right hip precautions (anterior approach)           Objective   Pain:  Pain Assessment  Pain Assessment: 0-10  0-10 (Numeric) Pain Score: 2  Pain Type: Surgical pain  Pain Location: Hip  Pain Orientation: Right  Pain Interventions: Repositioned, Ambulation/increased activity  Cognition:  Cognition  Overall Cognitive Status: Within Functional Limits  Attention: Within Functional Limits  Memory: Within Funtional Limits  Problem Solving: Within Functional Limits  Numeric Reasoning: Within Functional Limits  Abstract Reasoning: Within Functional Limits  Safety/Judgement: Within Functional Limits  Insight: Within function limits    General Assessments:  Activity Tolerance  Endurance: Endurance does not limit participation in activity    Sensation  Light Touch: No apparent  deficits    Coordination  Movements are Fluid and Coordinated: Yes    Postural Control  Postural Control: Within Functional Limits    Static Sitting Balance  Static Sitting-Balance Support: Feet supported  Static Sitting-Level of Assistance: Distant supervision  Dynamic Sitting Balance  Dynamic Sitting-Balance Support: Feet supported  Dynamic Sitting-Level of Assistance: Distant supervision    Static Standing Balance  Static Standing-Balance Support: Bilateral upper extremity supported (with RW)  Static Standing-Level of Assistance: Contact guard (due to intermittent R knee buckling)  Dynamic Standing Balance  Dynamic Standing-Balance Support: Bilateral upper extremity supported (with RW first trial; with RW and RLE knee immobilizer second trial)  Dynamic Standing-Level of Assistance: Contact guard, Close supervision (CGA first trial; CS second trial)  Functional Assessments:  ADL  ADL's Addressed: Yes  UE Dressing Assistance: Independent  LE Dressing Assistance: Minimal (spouse assisting)  LE Dressing Deficit: Setup, Verbal cueing, Thread RLE into underwear, Thread RLE into pants (due to RLE knee immobilizer in place)    Bed Mobility  Bed Mobility: Yes  Bed Mobility 1  Bed Mobility 1: Supine to sitting  Level of Assistance 1: Contact guard    Transfers  Transfer: Yes  Transfer 1  Transfer From 1: Bed to  Transfer to 1: Wheelchair  Technique 1: Sit to stand, Stand to sit  Transfer Device 1: Walker  Transfer Level of Assistance 1: Contact guard, Minimal verbal cues (cues for hand placement; assist due to intermittent R knee buckling)    Transfers 2  Transfer From 2: Wheelchair to, Chair with arms to  Transfer to 2: Wheelchair, Chair with arms  Technique 2: Sit to stand, Stand to sit  Transfer Device 2: Walker (RLE knee immobilizer)  Transfer Level of Assistance 2: Close supervision, Minimal verbal cues (cues for hand placement and RLE positioning in brace)    Ambulation/Gait Training  Ambulation/Gait Training  Performed: Yes  Ambulation/Gait Training 1  Surface 1: Level tile  Device 1: Rolling walker  Assistance 1: Contact guard, Minimal verbal cues (cues for sequencing)  Quality of Gait 1: Soft knee(s), Antalgic, Decreased step length, Knee(s) buckle (decreased jason, intermittent R knee buckling; step to pattern and intermittent reciprocal pattern.)  Comments/Distance (ft) 1: 100 feet x 1    Ambulation/Gait Training 2  Surface 2: Level tile  Device 2: Axillary crutches  Gait Support Devices: Knee immobilizer (RLE)  Assistance 2: Close supervision, Minimal verbal cues (cues for sequencing)  Quality of Gait 2: Decreased step length (decreased jason, reciprocal pattern, improved R knee control with RLE knee immobilizer in place.)  Comments/Distance (ft) 2: 100 feet x 1    Stairs  Stairs: Yes  Stairs  Rails 1: Right  Device 1: Railing  Support Devices 1: Gait belt (RLE knee immobilizer)  Assistance 1: Hand held assistance, Moderate assistance, Minimal verbal cues (cues for R quad contraction and sequencing)  Comment/Number of Steps 1: 1 attempted step, however R knee severely buckling, depsite knee immobilizer in place.    Stairs 2  Rails 2: Right (BUE support on one rail to simulate home)  Device 2: Railing  Support Devices 2:  (RLE knee immobilizer)  Assistance 2: Contact guard, Minimal verbal cues (cues for R quad contraction and sequencing)  Comment/Number of Steps 2: 3 x 2 trials  Pt demonstrated step to pattern, decreased jason, no LOB noted.   Extremity/Trunk Assessments:  RUE   RUE : Within Functional Limits  LUE   LUE: Within Functional Limits  RLE   RLE : Exceptions to WFL, hip ROM/strength limited due to post-op pain and surgeon restrictions.   LLE   LLE : Within Functional Limits  Treatments:  Therapeutic Exercise  Therapeutic Exercise Performed: Yes  B ankle pumps, R quad sets, R gluteal sets, R heel slides, and R SAQ x 10 reps each.  Outcome Measures:  [facility] Basic Mobility  Turning from your back to your  side while in a flat bed without using bedrails: None  Moving from lying on your back to sitting on the side of a flat bed without using bedrails: None  Moving to and from bed to chair (including a wheelchair): None  Standing up from a chair using your arms (e.g. wheelchair or bedside chair): None  To walk in hospital room: A little  Climbing 3-5 steps with railing: A little  Basic Mobility - Total Score: 22    Karena Weller, PT, DPT

## 2024-11-08 ASSESSMENT — ENCOUNTER SYMPTOMS
PAIN: 1
PAIN LOCATION: RIGHT HIP
MUSCLE WEAKNESS: 1
PERSON REPORTING PAIN: PATIENT
OCCASIONAL FEELINGS OF UNSTEADINESS: 0
LIMITED RANGE OF MOTION: 1

## 2024-11-08 ASSESSMENT — ACTIVITIES OF DAILY LIVING (ADL)
ENTERING_EXITING_HOME: MINIMUM ASSIST
OASIS_M1830: 03

## 2024-11-12 ENCOUNTER — HOME CARE VISIT (OUTPATIENT)
Dept: HOME HEALTH SERVICES | Facility: HOME HEALTH | Age: 54
End: 2024-11-12
Payer: COMMERCIAL

## 2024-11-12 PROCEDURE — G0157 HHC PT ASSISTANT EA 15: HCPCS | Mod: CQ

## 2024-11-14 ENCOUNTER — HOME CARE VISIT (OUTPATIENT)
Dept: HOME HEALTH SERVICES | Facility: HOME HEALTH | Age: 54
End: 2024-11-14
Payer: COMMERCIAL

## 2024-11-14 VITALS — HEART RATE: 86 BPM | DIASTOLIC BLOOD PRESSURE: 86 MMHG | SYSTOLIC BLOOD PRESSURE: 126 MMHG

## 2024-11-14 PROCEDURE — G0157 HHC PT ASSISTANT EA 15: HCPCS | Mod: CQ

## 2024-11-20 ENCOUNTER — HOME CARE VISIT (OUTPATIENT)
Dept: HOME HEALTH SERVICES | Facility: HOME HEALTH | Age: 54
End: 2024-11-20
Payer: COMMERCIAL

## 2024-11-20 PROCEDURE — G0151 HHCP-SERV OF PT,EA 15 MIN: HCPCS

## 2024-11-20 SDOH — ECONOMIC STABILITY: HOUSING INSECURITY: HOME SAFETY: THA (ANTERIOR)

## 2024-11-20 ASSESSMENT — ENCOUNTER SYMPTOMS
PERSON REPORTING PAIN: PATIENT
OCCASIONAL FEELINGS OF UNSTEADINESS: 0
PAIN: 1
MUSCLE WEAKNESS: 1

## 2024-11-20 ASSESSMENT — ACTIVITIES OF DAILY LIVING (ADL)
HOME_HEALTH_OASIS: 00
OASIS_M1830: 00

## 2024-12-19 ENCOUNTER — PREP FOR PROCEDURE (OUTPATIENT)
Dept: OPERATING ROOM | Facility: HOSPITAL | Age: 54
End: 2024-12-19
Payer: COMMERCIAL

## 2024-12-19 DIAGNOSIS — M16.12 PRIMARY OSTEOARTHRITIS OF LEFT HIP: Primary | ICD-10-CM

## 2025-01-28 ENCOUNTER — APPOINTMENT (OUTPATIENT)
Dept: RADIOLOGY | Facility: HOSPITAL | Age: 55
End: 2025-01-28
Payer: COMMERCIAL

## 2025-01-28 ENCOUNTER — HOSPITAL ENCOUNTER (OUTPATIENT)
Dept: RADIOLOGY | Facility: HOSPITAL | Age: 55
Discharge: HOME | End: 2025-01-28
Payer: COMMERCIAL

## 2025-01-28 DIAGNOSIS — M25.552 PAIN IN LEFT HIP: ICD-10-CM

## 2025-01-28 PROCEDURE — 73700 CT LOWER EXTREMITY W/O DYE: CPT | Mod: LT

## 2025-01-28 PROCEDURE — 73700 CT LOWER EXTREMITY W/O DYE: CPT | Mod: LEFT SIDE | Performed by: RADIOLOGY

## 2025-02-11 ENCOUNTER — PRE-ADMISSION TESTING (OUTPATIENT)
Dept: PREADMISSION TESTING | Facility: HOSPITAL | Age: 55
End: 2025-02-11
Payer: COMMERCIAL

## 2025-02-11 VITALS
WEIGHT: 276.24 LBS | RESPIRATION RATE: 18 BRPM | DIASTOLIC BLOOD PRESSURE: 82 MMHG | OXYGEN SATURATION: 95 % | TEMPERATURE: 96.6 F | HEART RATE: 78 BPM | SYSTOLIC BLOOD PRESSURE: 132 MMHG | HEIGHT: 73 IN | BODY MASS INDEX: 36.61 KG/M2

## 2025-02-11 DIAGNOSIS — M16.12 PRIMARY OSTEOARTHRITIS OF LEFT HIP: ICD-10-CM

## 2025-02-11 DIAGNOSIS — Z01.818 PRE-OPERATIVE EXAM: Primary | ICD-10-CM

## 2025-02-11 LAB
ALBUMIN SERPL BCP-MCNC: 4.4 G/DL (ref 3.4–5)
ALP SERPL-CCNC: 65 U/L (ref 33–120)
ALT SERPL W P-5'-P-CCNC: 21 U/L (ref 10–52)
ANION GAP SERPL CALC-SCNC: 13 MMOL/L (ref 10–20)
AST SERPL W P-5'-P-CCNC: 17 U/L (ref 9–39)
BASOPHILS # BLD AUTO: 0.04 X10*3/UL (ref 0–0.1)
BASOPHILS NFR BLD AUTO: 0.6 %
BILIRUB SERPL-MCNC: 0.6 MG/DL (ref 0–1.2)
BUN SERPL-MCNC: 20 MG/DL (ref 6–23)
CALCIUM SERPL-MCNC: 9.9 MG/DL (ref 8.6–10.3)
CHLORIDE SERPL-SCNC: 100 MMOL/L (ref 98–107)
CO2 SERPL-SCNC: 32 MMOL/L (ref 21–32)
CREAT SERPL-MCNC: 1.08 MG/DL (ref 0.5–1.3)
EGFRCR SERPLBLD CKD-EPI 2021: 82 ML/MIN/1.73M*2
EOSINOPHIL # BLD AUTO: 0.15 X10*3/UL (ref 0–0.7)
EOSINOPHIL NFR BLD AUTO: 2.3 %
ERYTHROCYTE [DISTWIDTH] IN BLOOD BY AUTOMATED COUNT: 11.9 % (ref 11.5–14.5)
GLUCOSE SERPL-MCNC: 86 MG/DL (ref 74–99)
HCT VFR BLD AUTO: 49.8 % (ref 41–52)
HGB BLD-MCNC: 16.9 G/DL (ref 13.5–17.5)
IMM GRANULOCYTES # BLD AUTO: 0.04 X10*3/UL (ref 0–0.7)
IMM GRANULOCYTES NFR BLD AUTO: 0.6 % (ref 0–0.9)
LYMPHOCYTES # BLD AUTO: 1.91 X10*3/UL (ref 1.2–4.8)
LYMPHOCYTES NFR BLD AUTO: 29.1 %
MCH RBC QN AUTO: 31.7 PG (ref 26–34)
MCHC RBC AUTO-ENTMCNC: 33.9 G/DL (ref 32–36)
MCV RBC AUTO: 93 FL (ref 80–100)
MONOCYTES # BLD AUTO: 0.54 X10*3/UL (ref 0.1–1)
MONOCYTES NFR BLD AUTO: 8.2 %
NEUTROPHILS # BLD AUTO: 3.89 X10*3/UL (ref 1.2–7.7)
NEUTROPHILS NFR BLD AUTO: 59.2 %
NRBC BLD-RTO: 0 /100 WBCS (ref 0–0)
PLATELET # BLD AUTO: 279 X10*3/UL (ref 150–450)
POTASSIUM SERPL-SCNC: 4.5 MMOL/L (ref 3.5–5.3)
PROT SERPL-MCNC: 7.2 G/DL (ref 6.4–8.2)
RBC # BLD AUTO: 5.33 X10*6/UL (ref 4.5–5.9)
SODIUM SERPL-SCNC: 140 MMOL/L (ref 136–145)
WBC # BLD AUTO: 6.6 X10*3/UL (ref 4.4–11.3)

## 2025-02-11 PROCEDURE — 85025 COMPLETE CBC W/AUTO DIFF WBC: CPT

## 2025-02-11 PROCEDURE — 99204 OFFICE O/P NEW MOD 45 MIN: CPT | Performed by: REGISTERED NURSE

## 2025-02-11 PROCEDURE — 36415 COLL VENOUS BLD VENIPUNCTURE: CPT

## 2025-02-11 PROCEDURE — 87081 CULTURE SCREEN ONLY: CPT | Mod: GEALAB

## 2025-02-11 PROCEDURE — 80053 COMPREHEN METABOLIC PANEL: CPT

## 2025-02-11 RX ORDER — CHLORHEXIDINE GLUCONATE ORAL RINSE 1.2 MG/ML
15 SOLUTION DENTAL DAILY
Qty: 120 ML | Refills: 0 | Status: SHIPPED | OUTPATIENT
Start: 2025-02-11 | End: 2025-02-13

## 2025-02-11 ASSESSMENT — PAIN SCALES - GENERAL: PAINLEVEL_OUTOF10: 3

## 2025-02-11 ASSESSMENT — DUKE ACTIVITY SCORE INDEX (DASI)
CAN YOU DO LIGHT WORK AROUND THE HOUSE LIKE DUSTING OR WASHING DISHES: YES
CAN YOU DO MODERATE WORK AROUND THE HOUSE LIKE VACUUMING, SWEEPING FLOORS OR CARRYING GROCERIES: YES
CAN YOU PARTICIPATE IN MODERATE RECREATIONAL ACTIVITIES LIKE GOLF, BOWLING, DANCING, DOUBLES TENNIS OR THROWING A BASEBALL OR FOOTBALL: YES
CAN YOU PARTICIPATE IN STRENOUS SPORTS LIKE SWIMMING, SINGLES TENNIS, FOOTBALL, BASKETBALL, OR SKIING: NO
CAN YOU HAVE SEXUAL RELATIONS: YES
CAN YOU WALK A BLOCK OR TWO ON LEVEL GROUND: YES
CAN YOU TAKE CARE OF YOURSELF (EAT, DRESS, BATHE, OR USE TOILET): YES
CAN YOU DO YARD WORK LIKE RAKING LEAVES, WEEDING OR PUSHING A MOWER: YES
CAN YOU WALK INDOORS, SUCH AS AROUND YOUR HOUSE: YES
CAN YOU RUN A SHORT DISTANCE: YES
TOTAL_SCORE: 50.7
DASI METS SCORE: 9
CAN YOU CLIMB A FLIGHT OF STAIRS OR WALK UP A HILL: YES
CAN YOU DO HEAVY WORK AROUND THE HOUSE LIKE SCRUBBING FLOORS OR LIFTING AND MOVING HEAVY FURNITURE: YES

## 2025-02-11 ASSESSMENT — ENCOUNTER SYMPTOMS
NEUROLOGICAL NEGATIVE: 1
EYES NEGATIVE: 1
GASTROINTESTINAL NEGATIVE: 1
NECK NEGATIVE: 1
RESPIRATORY NEGATIVE: 1
ARTHRALGIAS: 1
CONSTITUTIONAL NEGATIVE: 1
CARDIOVASCULAR NEGATIVE: 1

## 2025-02-11 ASSESSMENT — LIFESTYLE VARIABLES: SMOKING_STATUS: NONSMOKER

## 2025-02-11 ASSESSMENT — PAIN - FUNCTIONAL ASSESSMENT: PAIN_FUNCTIONAL_ASSESSMENT: 0-10

## 2025-02-11 ASSESSMENT — ACTIVITIES OF DAILY LIVING (ADL): ADL_SCORE: 0

## 2025-02-11 NOTE — H&P (VIEW-ONLY)
"CPM/PAT Evaluation       Name: Claudy Ascencio (Claudy Ascencio \"Sumanth\")  /Age: 1970/54 y.o.     In-Person       Chief Complaint: Evaluation prior to surgery    HPI    Past Medical History:   Diagnosis Date    Asthma     No issues for a long time    CPAP (continuous positive airway pressure) dependence     On cpap for 2 years    Hearing aid worn     7 years  bilateral    HL (hearing loss)     Long time    Joint pain     Several years    Sleep apnea     with CPAP    Wears glasses        Past Surgical History:   Procedure Laterality Date    COLONOSCOPY      All clear       Patient  reports being sexually active and has had partner(s) who are female. He reports using the following method of birth control/protection: None.    Family History   Problem Relation Name Age of Onset    Cancer Mother Breast Cancer     Arrhythmia Father      Valvular heart disease Sister         No Known Allergies    Prior to Admission medications    Medication Sig Start Date End Date Taking? Authorizing Provider   loratadine (Claritin) 10 mg tablet Take 1 tablet (10 mg) by mouth once daily.   Yes Historical Provider, MD   multivitamin tablet Take 1 tablet by mouth once daily.   Yes Historical Provider, MD   naproxen (Naprosyn) 250 mg tablet Take 1 tablet (250 mg) by mouth 2 times a day as needed for mild pain (1 - 3).   Yes Historical Provider, MD   chlorhexidine (Peridex) 0.12 % solution Use 15 mL in the mouth or throat once daily for 2 days. Use 15ml once the night before surgery and 15ml once the morning of surgery 25  Cammy Quintero, TANIKA-CNP   acetaminophen (Tylenol 8 HOUR) 650 mg ER tablet Take 1 tablet (650 mg) by mouth every 8 hours if needed for mild pain (1 - 3). Do not crush, chew, or split.  25  Historical Provider, MD   baclofen (Lioresal) 10 mg tablet Take 1 tablet (10 mg) by mouth 3 times a day as needed. 24     oxyCODONE-acetaminophen (Percocet) 5-325 mg tablet Take 1 " "tablet by mouth every 4 to 6 hours if needed for pain 11/5/24 2/11/25     polyethylene glycol (Glycolax, Miralax) 17 gram packet Take 17 g by mouth once daily. Once or twice a day when taking the Percocet to avoid constipation 11/6/24 2/11/25  James Mcginnis, DO ELLIS ROS:   Constitutional:   neg    Neuro/Psych:   neg    Eyes:   neg    Ears:    hearing loss   hearing aides  Nose:   Mouth:   neg    Throat:   neg    Neck:   neg    Cardio:   neg    Respiratory:   neg    Endocrine:   GI:   neg    :   neg    Musculoskeletal:    Left hip pain 3/10   arthralgias  Hematologic:   neg    Skin:      Physical Exam  Vitals reviewed.   Constitutional:       Appearance: Normal appearance.   HENT:      Head: Normocephalic and atraumatic.      Mouth/Throat:      Mouth: Mucous membranes are moist.      Pharynx: Oropharynx is clear.   Neck:      Vascular: No carotid bruit.   Cardiovascular:      Rate and Rhythm: Normal rate and regular rhythm.      Pulses: Normal pulses.      Heart sounds: Normal heart sounds.   Pulmonary:      Effort: Pulmonary effort is normal.      Breath sounds: Normal breath sounds.   Abdominal:      Palpations: Abdomen is soft.   Musculoskeletal:         General: Tenderness present.      Cervical back: Neck supple.      Comments: Left hip   Skin:     General: Skin is warm and dry.      Capillary Refill: Capillary refill takes less than 2 seconds.   Neurological:      General: No focal deficit present.      Mental Status: He is alert and oriented to person, place, and time.   Psychiatric:         Behavior: Behavior normal.         Thought Content: Thought content normal.         Judgment: Judgment normal.          PAT AIRWAY:   Airway:     Neck ROM::  Full  normal          Visit Vitals  /82   Pulse 78   Temp 35.9 °C (96.6 °F)   Resp 18   Ht 1.854 m (6' 1\")   Wt 125 kg (276 lb 3.8 oz)   SpO2 95%   BMI 36.44 kg/m²   Smoking Status Never   BSA 2.54 m²       DASI Risk Score      Flowsheet Row " Pre-Admission Testing from 2/11/2025 in [facility] Questionnaire Series Submission from 1/23/2025 in [facility] Care with Generic Provider Manas   Can you take care of yourself (eat, dress, bathe, or use toilet)?  2.75 filed at 02/11/2025 0929 2.75  filed at 01/23/2025 1332   Can you walk indoors, such as around your house? 1.75 filed at 02/11/2025 0929 1.75  filed at 01/23/2025 1332   Can you walk a block or two on level ground?  2.75 filed at 02/11/2025 0929 2.75  filed at 01/23/2025 1332   Can you climb a flight of stairs or walk up a hill? 5.5 filed at 02/11/2025 0929 5.5  filed at 01/23/2025 1332   Can you run a short distance? 8 filed at 02/11/2025 0929 0  filed at 01/23/2025 1332   Can you do light work around the house like dusting or washing dishes? 2.7 filed at 02/11/2025 0929 2.7  filed at 01/23/2025 1332   Can you do moderate work around the house like vacuuming, sweeping floors or carrying groceries? 3.5 filed at 02/11/2025 0929 3.5  filed at 01/23/2025 1332   Can you do heavy work around the house like scrubbing floors or lifting and moving heavy furniture?  8 filed at 02/11/2025 0929 0  filed at 01/23/2025 1332   Can you do yard work like raking leaves, weeding or pushing a mower? 4.5 filed at 02/11/2025 0929 0  filed at 01/23/2025 1332   Can you have sexual relations? 5.25 filed at 02/11/2025 0929 5.25  filed at 01/23/2025 1332   Can you participate in moderate recreational activities like golf, bowling, dancing, doubles tennis or throwing a baseball or football? 6 filed at 02/11/2025 0929 0  filed at 01/23/2025 1332   Can you participate in strenous sports like swimming, singles tennis, football, basketball, or skiing? 0 filed at 02/11/2025 0929 0  filed at 01/23/2025 1332   DASI SCORE 50.7 filed at 02/11/2025 0929 24.2  filed at 01/23/2025 1332   METS Score (Will be calculated only when all the questions are answered) 9 filed at 02/11/2025 0929 5.7  filed at 01/23/2025 1333           Caprini DVT Assessment      Flowsheet Row Pre-Admission Testing from 2/11/2025 in [facility] Admission (Discharged) from 11/6/2024 in [facility] OR with James Mcginnis DO   DVT Score (IF A SCORE IS NOT CALCULATING, MUST SELECT A BMI TO COMPLETE) 12 filed at 02/11/2025 0955 9 filed at 11/05/2024 1233   Surgical Factors Elective major lower extremity arthroplasty, Major surgery planned, lasting 2-3 hours filed at 02/11/2025 0955 Elective major lower extremity arthroplasty filed at 11/05/2024 1233   BMI (BMI MUST BE CHOSEN) 31-40 (Obesity) filed at 02/11/2025 0955 31-40 (Obesity) filed at 11/05/2024 1233          Modified Frailty Index      Flowsheet Row Pre-Admission Testing from 10/23/2024 in [facility]   Non-independent functional status (problems with dressing, bathing, personal grooming, or cooking) 0 filed at 10/23/2024 1043   History of diabetes mellitus  0 filed at 10/23/2024 1043   History of COPD 0 filed at 10/23/2024 1043   History of CHF No filed at 10/23/2024 1043   History of MI 0 filed at 10/23/2024 1043   History of Percutaneous Coronary Intervention, Cardiac Surgery, or Angina No filed at 10/23/2024 1043   Hypertension requiring the use of medication  0 filed at 10/23/2024 1043   Peripheral vascular disease 0 filed at 10/23/2024 1043   Impaired sensorium (cognitive impairement or loss, clouding, or delirium) 0 filed at 10/23/2024 1043   TIA or CVA withouy residual deficit 0 filed at 10/23/2024 1043   Cerebrovascular accident with deficit 0 filed at 10/23/2024 1043   Modified Frailty Index Calculator 0 filed at 10/23/2024 1043          CHADS2 Stroke Risk  Current as of 4 minutes ago        N/A 3 to 100%: High Risk   2 to < 3%: Medium Risk   0 to < 2%: Low Risk     Last Change: N/A          This score determines the patient's risk of having a stroke if the patient has atrial fibrillation.        This score is not applicable to this patient. Components  are not calculated.          Revised Cardiac Risk Index      Flowsheet Row Pre-Admission Testing from 2/11/2025 in [facility] Pre-Admission Testing from 10/23/2024 in [facility]   High-Risk Surgery (Intraperitoneal, Intrathoracic,Suprainguinal vascular) 0 filed at 02/11/2025 0958 0 filed at 10/23/2024 1043   History of ischemic heart disease (History of MI, History of positive exercuse test, Current chest paint considered due to myocardial ischemia, Use of nitrate therapy, ECG with pathological Q Waves) 0 filed at 02/11/2025 0958 0 filed at 10/23/2024 1043   History of congestive heart failure (pulmonary edemia, bilateral rales or S3 gallop, Paroxysmal nocturnal dyspnea, CXR showing pulmonary vascular redistribution) 0 filed at 02/11/2025 0958 0 filed at 10/23/2024 1043   History of cerebrovascular disease (Prior TIA or stroke) 0 filed at 02/11/2025 0958 0 filed at 10/23/2024 1043   Pre-operative insulin treatment 0 filed at 02/11/2025 0958 0 filed at 10/23/2024 1043   Pre-operative creatinine>2 mg/dl 0 filed at 02/11/2025 0958 0 filed at 10/23/2024 1043   Revised Cardiac Risk Calculator 0 filed at 02/11/2025 0958 0 filed at 10/23/2024 1043          Apfel Simplified Score      Flowsheet Row Pre-Admission Testing from 2/11/2025 in [facility] Pre-Admission Testing from 10/23/2024 in [facility]   Smoking status 1 filed at 02/11/2025 0959 1 filed at 10/23/2024 1043   History of motion sickness or PONV  0 filed at 02/11/2025 0959 1 filed at 10/23/2024 1043   Use of postoperative opioids 1 filed at 02/11/2025 0959 0 filed at 10/23/2024 1043   Gender - Female 0=No filed at 02/11/2025 0959 0=No filed at 10/23/2024 1043   Apfel Simplified Score Calculator 2 filed at 02/11/2025 0959 2 filed at 10/23/2024 1043          Risk Analysis Index Results This Encounter         2/11/2025  0929             Do you live in a place other than your own home?: 0    When did you  begin living in the place you are currently residing?: Greater than one year ago    Any kidney failure, kidney not working well, or seeing a kidney doctor (nephrologist)? If yes, was this for kidney stones or another problem?: 0 No    Any history of chronic (long-term) congestive heart failure (CHF)?: 0 No    Any shortness of breath when resting?: 0 No    In the past five years, have you been diagnosed with or treated for cancer?: No    During the last 3 months has it become difficult for you to remember things or organize your thoughts?: 0 No    Have you lost weight of 10 pounds or more in the past 3 months without trying?: 0 No    Do you have any loss of appetitie?: 0 No    Getting Around (Mobility): 0 Can get around without help    Eatin Can plan and prepare own meals    Toiletin Can use toilet without any help    Personal Hygiene (Bathing, Hand Washing, Changing Clothes): 0 Can shower or bathe without any help    MILLAN Cancer History: Patient does not indicate history of cancer    Total Risk Analysis Index Score Without Cancer: 17    Total Risk Analysis Index Score: 17          Stop Bang Score      Flowsheet Row Pre-Admission Testing from 2025 in [facility] Admission (Discharged) from 2024 in [facility] OR with James Mcginnis, DO   Do you snore loudly? 0 filed at 2025 1 filed at 2024 0846   Do you often feel tired or fatigued after your sleep? 0 filed at 2025 1 filed at 2024 0846   Has anyone ever observed you stop breathing in your sleep? 0 filed at 2025 1 filed at 2024 0846   Do you have or are you being treated for high blood pressure? 0 filed at 2025 0 filed at 2024 0846   Recent BMI (Calculated) 33.8 filed at 2025 33.8 filed at 2024 0846   Is BMI greater than 35 kg/m2? 0=No filed at 2025 0=No filed at 2024 0846   Age older than 50 years old? 1=Yes filed at  02/11/2025 0928 1=Yes filed at 11/06/2024 0846   Is your neck circumference greater than 17 inches (Male) or 16 inches (Female)? 1 filed at 02/11/2025 0928 1 filed at 11/06/2024 0846   Gender - Male 1=Yes filed at 02/11/2025 0928 1=Yes filed at 11/06/2024 0846   STOP-BANG Total Score 3 filed at 02/11/2025 0928 6 filed at 11/06/2024 0846          Prodigy: High Risk  Total Score: 8              Prodigy Gender Score          ARISCAT Score for Postoperative Pulmonary Complications      Flowsheet Row Pre-Admission Testing from 10/23/2024 in [facility]   Age Calculated Score 3 filed at 10/23/2024 1042   Preoperative SpO2 0 filed at 10/23/2024 1042   Respiratory infection in the last month Either upper or lower (i.e., URI, bronchitis, pneumonia), with fever and antibiotic treatment 0 filed at 10/23/2024 1042   Preoperative anemia (Hgb less than 10 g/dl) 0 filed at 10/23/2024 1042   Surgical incision  0 filed at 10/23/2024 1042   Duration of surgery  16 filed at 10/23/2024 1042   Emergency Procedure  0 filed at 10/23/2024 1042   ARISCAT Total Score  13 filed at 10/23/2024 1042          Ware Perioperative Risk for Myocardial Infarction or Cardiac Arrest (DOMINIC)      Flowsheet Row Pre-Admission Testing from 10/23/2024 in [facility]   Calculated Age Score 1.06 filed at 10/23/2024 1042   Functional Status  0 filed at 10/23/2024 1042   ASA Class  -3.29 filed at 10/23/2024 1042   Creatinine 0 filed at 10/23/2024 1042   Type of Procedure  0.80 filed at 10/23/2024 1042   DOMINIC Total Score  -2.49 filed at 10/23/2024 1042              Assessment & Plan:    54 y.o.  male  scheduled for GIOVANNI TOTAL HIP ARTHROPLASTY - Left  on 2/25/25 with Dr. Mcginnis for Primary osteoarthritis of left hip .  PMHX includes Asthma, KYRIE with Cpap, hearing loss.  PAT consulted for perioperative risk stratification and optimization    Neuro:  No neurologic diagnosis or significant findings on chart review, clinical presentation  and evaluation.  No grossly apparent neurologic perioperative risk.  age  Patient is not at increased risk for perioperative CVA      HEENT:  Hx hearing loss, use of aids    Cardiovascular:  No CV diagnosis or significant findings on chart review or clinical presentation and evaluation. No further preoperative testing or intervention is indicated at this time.  METS: 5.7  RCRI: 0 points, 3.9%  risk for postoperative MACE   DOMINIC: 0.1% risk for 30 day postoperative MACE  EKG - 10/23/24  Normal sinus rhythm  Normal ECG  No previous ECGs available  Confirmed by Huey Espinosa ([id number]) on 10/23/2024 4:18:00 PM    Pulmonary:  Hx keara, KYRIE with Cpap  obesity, duration of surgery > 2 hours  Stop Bang score is 3 placing patient at internediate risk for KYRIE  ARISCAT: <26 points, 1.6% risk of in-hospital postoperative pulmonary complication  PRODIGY: High risk for opioid induced respiratory depression    Pulmonary education discussed. Patient provided deep breathing exercises and educational handout.      Urological/Renal  No diagnosis or significant findings on chart review or clinical presentation and evaluation.     Endocrine:  No endocrine diagnosis or significant findings on chart review or clinical presentation and evaluation. No further testing or intervention is indicated at this time.    A1C 10/23/24 4.7    Hematologic:  Antiplatelet management   The patient is not currently receiving antiplatelet therapy.  Anticoagulation management  The patient is not currently receiving anticoagulation therapy. Patient provided with DVT educational handout.    Caprini Score 12, patient at high risk for perioperative DVT.  Patient provided with VTE education/handout.    Gastrointestinal:   No GI diagnosis or significant findings on chart review or clinical presentation and evaluation.   Eat-10 score 0      Infectious disease:   No infectious diagnosis or significant findings on chart review or clinical presentation and evaluation.    Prescription provided for CHG body wash and dental rinse. CHG use instructions reviewed and provided to patient.  Staph screen collected    Musculoskeletal:   Primary osteoarthritis of left hip  CT Left Hip 1/28/25  IMPRESSION:  1. Severe degenerative change of the left hip.  2. Findings most compatible with surgical scarring with small fluid  collection likely a postoperative seroma along the prior incision  tract for of the right hip arthroplasty.  3. Region of increased density in the right inferior pubic ramus into  the pubic body. This is felt to be similar to the prior exam and  overall an indolent etiology is favored. May represent reactive  sclerosis. Indolent fibro-osseous lesion of bone such as a  nonossifying fibroma or a bone island may also be in the differential  diagnosis.  4. Other findings as above.    Anesthesia/Airway:  No anesthesia complications      Medication instructions and NPO guidelines reviewed with the patient.  All questions or concerns discussed and addressed.      Labs ordered   CBC  CMP  MRSA  Peridex

## 2025-02-11 NOTE — PREPROCEDURE INSTRUCTIONS
Medication List            Accurate as of February 11, 2025  9:40 AM. Always use your most recent med list.                chlorhexidine 0.12 % solution  Commonly known as: Peridex  Use 15 mL in the mouth or throat once daily for 2 days. Use 15ml once the night before surgery and 15ml once the morning of surgery  Medication Adjustments for Surgery: Take/Use as prescribed     loratadine 10 mg tablet  Commonly known as: Claritin  Medication Adjustments for Surgery: Take/Use as prescribed     multivitamin tablet  Additional Medication Adjustments for Surgery: Take last dose 7 days before surgery     naproxen 250 mg tablet  Commonly known as: Naprosyn  Additional Medication Adjustments for Surgery: Take last dose 7 days before surgery                                  Thank you for visiting [facility] ([facility]) today for your pre-procedure evaluation, you were seen by     UZMA Hadley  Pre Admission Testing  [facility]  [phone]    This summary includes instructions and information to aid you during your perioperative period.  Please read carefully. If you have any questions about your visit today, please call the number listed above.  If you become ill or have any changes to your health before your surgery, please contact your primary care provider and alert your surgeon.      Preparing for your Surgery       Exercises  Preoperative Deep Breathing Exercises  Why it is important to do deep breathing exercises before my surgery?  Deep breathing exercises strengthen your breathing muscles.  This helps you to recover after your surgery and decreases the chance of breathing complications.  How are the deep breathing exercises done?  Sit straight with your back supported.  Breathe in deeply and slowly through your nose. Your lower rib cage should expand and your abdomen may move forward.  Hold that breath for 3 to 5 seconds.  Breathe out through pursed lips, slowly and  completely.  Rest and repeat 10 times every hour while awake.  Rest longer if you become dizzy or lightheaded.       Preoperative Brain Exercises    What are brain exercises?  A brain exercise is any activity that engages your thinking (cognitive) skills.    What types of activities are considered brain exercises?  Jigsaw puzzles, crossword puzzles, word jumble, memory games, word search, and many more.  Many can be found free online or on your phone via a mobile amairani.    Why should I do brain exercises before my surgery?  More recent research has shown brain exercise before surgery can lower the risk of postoperative delirium (confusion) which can be especially important for older adults.  Patients who did brain exercises for 5 to 10 hours the days before surgery, cut their risk of postoperative delirium in half up to 1 week after surgery.    Sit-to-Stand Exercise    What is the sit-to-stand exercise?  The sit-to-stand exercise strengthens the muscles of your lower body and muscles in the center of your body (core muscles for stability) helping to maintain and improve your strength and mobility.  How do I do the sit-to-stand exercise?  The goal is to do this exercise without using your arms or hands.  If this is too difficult, use your arms and hands or a chair with armrests to help slowly push yourself to the standing position and lower yourself back to the sitting position. As the movement becomes easier use your arms and hands less.    Steps to the sit-to-stand exercise  Sit up tall in a sturdy chair, knees bent, feet flat on the floor shoulder-width apart.  Shift your hips/pelvis forward in the chair to correctly position yourself for the next movement.  Lean forward at your hips.  Stand up straight putting equal weight on both feet.  Check to be sure you are properly aligned with the chair, in a slow controlled movement sit back down.  Repeat this exercise 10-15 times.  If needed you can do it fewer times until  your strength improves.  Rest for 1 minute.  Do another 10-15 sit-to-stand exercises.  Try to do this in the morning and evening.        Instructions    Preoperative Fasting Guidelines    Why must I stop eating and drinking near surgery time?  With sedation, food or liquid in your stomach can enter your lungs causing serious complications  Food can increase nausea and vomiting  When do I need to stop eating and drinking before my surgery?      Do not eat any food after midnight the night before your surgery/procedure. You may have up to 13.5 ounces of clear liquid until TWO hours before your instructed arrival time to the hospital.  This includes water, black tea/coffee, (no milk or cream) apple juice, and electrolyte drinks (Gatorade). You may chew gum until TWO hours before your surgery/procedure            Simple things you can do to help prevent blood clots     Blood clots are blockages that can form in the body's veins. When a blood clot forms in your deep veins, it may be called a deep vein thrombosis, or DVT for short. Blood clots can happen in any part of the body where blood flows, but they are most common in the arms and legs. If a piece of a blood clot breaks free and travels to the lungs, it is called a pulmonary embolus (PE). A PE can be a very serious problem.         Being in the hospital or having surgery can raise your chances of getting a blood clot because you may not be well enough to move around as much as you normally do.         Ways you can help prevent blood clots in the hospital       Wearing SCDs  SCDs stands for Sequential Compression Devices.   SCDs are special sleeves that wrap around your legs. They attach to a pump that fills them with air to gently squeeze your legs every few minutes.  This helps return the blood in your legs to your heart.   SCDs should only be taken off when walking or bathing. SCDs may not be comfortable, but they can help save your life.              Pump SCD leg  sleeves  Wearing compression stockings - if your doctor orders them. These special snug-fitting stockings gently squeeze your legs to help blood flow.       Walking. Walking helps move the blood in your legs.   If your doctor says it is ok, try walking the halls at least   5 times a day. Ask us to help you get up, so you don't fall.      Taking any blood-thinning medicines your doctor orders.              Ways you can help prevent blood clots at home         Wearing compression stockings - if your doctor orders them.   Walking - to help move the blood in your legs.    Taking any blood-thinning medicines your doctor orders.      Signs of a blood clot or PE    Tell your doctor or nurse right away if you have any of the problems listed below.         If you are at home, seek medical care right away. Call 911 for chest pain or problems breathing.            Signs of a blood clot (DVT) - such as pain, swelling, redness, or warmth in your arm or legs.  Signs of a pulmonary embolism (PE) - such as chest pain or feeling short of breath      Tobacco and Alcohol;  Do not drink alcohol or smoke within 24 hours of surgery.  It is best to quit smoking for as long as possible before any surgery or procedure.    Quitting Alcohol; Things to think about: Alcohol use disorder is a health condition that can improve with treatment. Each person is unique. A treatment that is good for one person may not be a good fit for someone else. Simply knowing the different options can be an important first step. Treatment can be inpatient, where you stay at a facility, or outpatient, where you stay at home. Your insurance plan may cover some treatment costs.   Resources:    NIAAA Alcohol Treatment Navigator - from the [place] on Alcohol Abuse and  Alcoholism. Offers an online tool to help you find the right treatment for you - near you. Go to alcoholtreatment.niaaa.nih.gov.  [facility] National Hotline  - from the Substance Abuse and Mental  "Health Services Administration. A free, confidential 24-hour treatment referral and information service for anyone facing mental and/or substance use disorders. Go to findtreatment.gov or call 7-551-254-HELP (6365).    Alcoholics Anonymous (AA) - offers group meetings and a 12-step program to anyone who has a drinking problem. Go to aa.org or call 373-426-2634    [facility] 2-1-1 - to find local programs and resources. Call 211 or go to 211.org    Other people you can talk to about treatment options include your primary care doctor, health insurance plan, local health department, or employee assistance program. You can also ask to talk to a hospital .       Other Instructions  Why did I have my nose, under my arms, and groin swabbed? The purpose of the swab is to identify Staphylococcus aureus inside your nose or on your skin.  The swab was sent to the laboratory for culture.  A positive swab/culture for Staphylococcus aureus is called colonization or carriage.     What is Staphylococcus aureus? Staphylococcus aureus, also known as \"staph\", is a germ found on the skin or in the nose of healthy people.  Sometimes Staphylococcus aureus can get into the body and cause an infection.  This can be minor (such as pimples, boils, or other skin problems).  It might also be serious (such as a blood infection, pneumonia, or a surgical site infection).     What is Staphylococcus aureus colonization or carriage? Colonization or carriage means that a person has the germ but is not sick from it.  These bacteria can be spread on the hands or when breathing or sneezing.   How is Staphylococcus aureus spread? It is most often spread by close contact with a person or item that carries it.   What happens if my culture is positive for Staphylococcus aureus? Your doctor/medical team will use this information to guide any antibiotic treatment which may be necessary.  Regardless of the culture results, we will clean the inside " of your nose with a betadine swab just before you have your surgery.   Will I get an infection if I have Staphylococcus aureus in my nose or on my skin? Anyone can get an infection with Staphylococcus aureus.  However, the best way to reduce your risk of infection is to follow the instructions provided to you for the use of your CHG soap and dental rinse.      Body Wash:     What is a home preoperative antibacterial shower? This shower is a way of cleaning the skin with a germ-killing solution before surgery.  The solution contains chlorhexidine, commonly known as CHG.  CHG is a skin cleanser with germ-killing ability.  Let your doctor know if you are allergic to chlorhexidine.   Why do I need to take a preoperative antibacterial shower? Skin is not sterile.  It is best to try to make your skin as free of germs as possible before surgery.  Proper cleansing with a germ-killing soap before surgery can lower the number of germs on your skin.  This helps to reduce the risk of infection at the surgical site.    Following the instructions listed below will help you prepare your skin for surgery.   How do I use the solution? Steps:  Begin using your CHG soap 5 days before your scheduled surgery.      Oral/Dental Rinse:     What is oral/dental rinse?  It is a mouthwash. It is a way of cleaning the mouth with a germ-killing solution before your surgery.  The solution contains chlorhexidine, commonly known as CHG. It is used inside the mouth to kill a bacteria known as Staphylococcus aureus.  Let your doctor know if you are allergic to Chlorhexidine.   Why do I need to use CHG oral/dental rinse? The CHG oral/dental rinse helps to kill a bacteria in your mouth known as Staphylococcus aureus.  This reduces the risk of infection at the surgical site.    Using your CHG oral/dental rinse STEPS: Use your CHG oral/dental rinse after you brush your teeth the night before (at bedtime) and the morning of your surgery.  Follow all  directions on your prescription label.    Use the cap on the container to measure 15 ml.  Swish (gargle if you can) the mouthwash in your mouth for at least 30 seconds, (do not swallow) and spit out.  After you use your CHG rinse, do not rinse your mouth with water, drink or eat.    Please refer to the prescription label for the appropriate time to resume oral intake   What side effects might I have using the CHG oral/dental rinse? CHG rinse will stick to plaque on the teeth.  Brush and floss just before use.  Teeth brushing will help avoid staining of plaque during use.     The Week before Surgery        Seven days before Surgery  Check your PAT medication instructions  Do the exercises provided to you by PAT  Arrange for a responsible, adult licensed  to take you home after surgery and stay with you for 24 hours.  You will not be permitted to drive yourself home if you have received any anesthetic/sedation  Six days before surgery  Check your PAT medication instructions  Do the exercises provided to you by PAT  Start using Chlorhexidene (CHG) body wash if prescribed  Five days before surgery  Check your PAT medication instructions  Do the exercises provided to you by PAT  Continue to use CHG body wash if prescribed  Three days before surgery  Check your PAT medication instructions  Do the exercises provided to you by PAT  Continue to use CHG body wash if prescribed  Two days before surgery  Check your PAT medication instructions  Do the exercises provided to you by PAT  Continue to use CHG body wash if prescribed    The Day before Surgery       Check your PAT medication and all other PAT instructions including when to stop eating and drinking  You will be called with your arrival time for surgery in the late afternoon.  If you do not receive a call please reach out to [facility]. [phone]  Do not smoke or drink 24 hours before surgery  Prepare items to bring with you to the hospital  Shower with your  chlorhexidine wash if prescribed  Brush your teeth and use your chlorhexidine dental rinse if prescribed    The Day of Surgery       Check your PAT medication instructions  Ensure you follow the instructions for when to stop eating and drinking  Shower, if prescribed use CHG.  Do not apply any lotions, creams, moisturizers, perfume or deodorant  Brush your teeth and use your CHG dental rinse if prescribed  Wear loose comfortable clothing  Avoid make-up  Remove  jewelry and piercings, consider professional piercing removal with a plastic spacer if needed  Bring photo ID and Insurance card  Bring an accurate medication list that includes medication dose, frequency and allergies  Bring a copy of your advanced directives (will, health care power of )  Bring any devices and controllers as well as medical devices you have been provided with for surgery (CPAP, slings, braces, etc.)  Dentures, eyeglasses, and contacts will be removed before surgery, please bring cases for contacts or glasses

## 2025-02-11 NOTE — CPM/PAT H&P
"CPM/PAT Evaluation       Name: Claudy Ascencio (Claudy Ascencio \"Sumanth\")  /Age: 1970/54 y.o.     In-Person       Chief Complaint: Evaluation prior to surgery    HPI    Past Medical History:   Diagnosis Date    Asthma     No issues for a long time    CPAP (continuous positive airway pressure) dependence     On cpap for 2 years    Hearing aid worn     7 years  bilateral    HL (hearing loss)     Long time    Joint pain     Several years    Sleep apnea     with CPAP    Wears glasses        Past Surgical History:   Procedure Laterality Date    COLONOSCOPY      All clear       Patient  reports being sexually active and has had partner(s) who are female. He reports using the following method of birth control/protection: None.    Family History   Problem Relation Name Age of Onset    Cancer Mother Breast Cancer     Arrhythmia Father      Valvular heart disease Sister         No Known Allergies    Prior to Admission medications    Medication Sig Start Date End Date Taking? Authorizing Provider   loratadine (Claritin) 10 mg tablet Take 1 tablet (10 mg) by mouth once daily.   Yes Historical Provider, MD   multivitamin tablet Take 1 tablet by mouth once daily.   Yes Historical Provider, MD   naproxen (Naprosyn) 250 mg tablet Take 1 tablet (250 mg) by mouth 2 times a day as needed for mild pain (1 - 3).   Yes Historical Provider, MD   chlorhexidine (Peridex) 0.12 % solution Use 15 mL in the mouth or throat once daily for 2 days. Use 15ml once the night before surgery and 15ml once the morning of surgery 25  Cammy Quintero, TANIKA-CNP   acetaminophen (Tylenol 8 HOUR) 650 mg ER tablet Take 1 tablet (650 mg) by mouth every 8 hours if needed for mild pain (1 - 3). Do not crush, chew, or split.  25  Historical Provider, MD   baclofen (Lioresal) 10 mg tablet Take 1 tablet (10 mg) by mouth 3 times a day as needed. 24     oxyCODONE-acetaminophen (Percocet) 5-325 mg tablet Take 1 " "tablet by mouth every 4 to 6 hours if needed for pain 11/5/24 2/11/25     polyethylene glycol (Glycolax, Miralax) 17 gram packet Take 17 g by mouth once daily. Once or twice a day when taking the Percocet to avoid constipation 11/6/24 2/11/25  James Mcginnis, DO ELLIS ROS:   Constitutional:   neg    Neuro/Psych:   neg    Eyes:   neg    Ears:    hearing loss   hearing aides  Nose:   Mouth:   neg    Throat:   neg    Neck:   neg    Cardio:   neg    Respiratory:   neg    Endocrine:   GI:   neg    :   neg    Musculoskeletal:    Left hip pain 3/10   arthralgias  Hematologic:   neg    Skin:      Physical Exam  Vitals reviewed.   Constitutional:       Appearance: Normal appearance.   HENT:      Head: Normocephalic and atraumatic.      Mouth/Throat:      Mouth: Mucous membranes are moist.      Pharynx: Oropharynx is clear.   Neck:      Vascular: No carotid bruit.   Cardiovascular:      Rate and Rhythm: Normal rate and regular rhythm.      Pulses: Normal pulses.      Heart sounds: Normal heart sounds.   Pulmonary:      Effort: Pulmonary effort is normal.      Breath sounds: Normal breath sounds.   Abdominal:      Palpations: Abdomen is soft.   Musculoskeletal:         General: Tenderness present.      Cervical back: Neck supple.      Comments: Left hip   Skin:     General: Skin is warm and dry.      Capillary Refill: Capillary refill takes less than 2 seconds.   Neurological:      General: No focal deficit present.      Mental Status: He is alert and oriented to person, place, and time.   Psychiatric:         Behavior: Behavior normal.         Thought Content: Thought content normal.         Judgment: Judgment normal.          PAT AIRWAY:   Airway:     Neck ROM::  Full  normal          Visit Vitals  /82   Pulse 78   Temp 35.9 °C (96.6 °F)   Resp 18   Ht 1.854 m (6' 1\")   Wt 125 kg (276 lb 3.8 oz)   SpO2 95%   BMI 36.44 kg/m²   Smoking Status Never   BSA 2.54 m²       DASI Risk Score      Flowsheet Row " Pre-Admission Testing from 2/11/2025 in [facility] Questionnaire Series Submission from 1/23/2025 in [facility] Care with Generic Provider Manas   Can you take care of yourself (eat, dress, bathe, or use toilet)?  2.75 filed at 02/11/2025 0929 2.75  filed at 01/23/2025 1332   Can you walk indoors, such as around your house? 1.75 filed at 02/11/2025 0929 1.75  filed at 01/23/2025 1332   Can you walk a block or two on level ground?  2.75 filed at 02/11/2025 0929 2.75  filed at 01/23/2025 1332   Can you climb a flight of stairs or walk up a hill? 5.5 filed at 02/11/2025 0929 5.5  filed at 01/23/2025 1332   Can you run a short distance? 8 filed at 02/11/2025 0929 0  filed at 01/23/2025 1332   Can you do light work around the house like dusting or washing dishes? 2.7 filed at 02/11/2025 0929 2.7  filed at 01/23/2025 1332   Can you do moderate work around the house like vacuuming, sweeping floors or carrying groceries? 3.5 filed at 02/11/2025 0929 3.5  filed at 01/23/2025 1332   Can you do heavy work around the house like scrubbing floors or lifting and moving heavy furniture?  8 filed at 02/11/2025 0929 0  filed at 01/23/2025 1332   Can you do yard work like raking leaves, weeding or pushing a mower? 4.5 filed at 02/11/2025 0929 0  filed at 01/23/2025 1332   Can you have sexual relations? 5.25 filed at 02/11/2025 0929 5.25  filed at 01/23/2025 1332   Can you participate in moderate recreational activities like golf, bowling, dancing, doubles tennis or throwing a baseball or football? 6 filed at 02/11/2025 0929 0  filed at 01/23/2025 1332   Can you participate in strenous sports like swimming, singles tennis, football, basketball, or skiing? 0 filed at 02/11/2025 0929 0  filed at 01/23/2025 1332   DASI SCORE 50.7 filed at 02/11/2025 0929 24.2  filed at 01/23/2025 1332   METS Score (Will be calculated only when all the questions are answered) 9 filed at 02/11/2025 0929 5.7  filed at 01/23/2025 1330           Caprini DVT Assessment      Flowsheet Row Pre-Admission Testing from 2/11/2025 in [facility] Admission (Discharged) from 11/6/2024 in [facility] OR with James Mcginnis DO   DVT Score (IF A SCORE IS NOT CALCULATING, MUST SELECT A BMI TO COMPLETE) 12 filed at 02/11/2025 0955 9 filed at 11/05/2024 1233   Surgical Factors Elective major lower extremity arthroplasty, Major surgery planned, lasting 2-3 hours filed at 02/11/2025 0955 Elective major lower extremity arthroplasty filed at 11/05/2024 1233   BMI (BMI MUST BE CHOSEN) 31-40 (Obesity) filed at 02/11/2025 0955 31-40 (Obesity) filed at 11/05/2024 1233          Modified Frailty Index      Flowsheet Row Pre-Admission Testing from 10/23/2024 in [facility]   Non-independent functional status (problems with dressing, bathing, personal grooming, or cooking) 0 filed at 10/23/2024 1043   History of diabetes mellitus  0 filed at 10/23/2024 1043   History of COPD 0 filed at 10/23/2024 1043   History of CHF No filed at 10/23/2024 1043   History of MI 0 filed at 10/23/2024 1043   History of Percutaneous Coronary Intervention, Cardiac Surgery, or Angina No filed at 10/23/2024 1043   Hypertension requiring the use of medication  0 filed at 10/23/2024 1043   Peripheral vascular disease 0 filed at 10/23/2024 1043   Impaired sensorium (cognitive impairement or loss, clouding, or delirium) 0 filed at 10/23/2024 1043   TIA or CVA withouy residual deficit 0 filed at 10/23/2024 1043   Cerebrovascular accident with deficit 0 filed at 10/23/2024 1043   Modified Frailty Index Calculator 0 filed at 10/23/2024 1043          CHADS2 Stroke Risk  Current as of 4 minutes ago        N/A 3 to 100%: High Risk   2 to < 3%: Medium Risk   0 to < 2%: Low Risk     Last Change: N/A          This score determines the patient's risk of having a stroke if the patient has atrial fibrillation.        This score is not applicable to this patient. Components  are not calculated.          Revised Cardiac Risk Index      Flowsheet Row Pre-Admission Testing from 2/11/2025 in [facility] Pre-Admission Testing from 10/23/2024 in [facility]   High-Risk Surgery (Intraperitoneal, Intrathoracic,Suprainguinal vascular) 0 filed at 02/11/2025 0958 0 filed at 10/23/2024 1043   History of ischemic heart disease (History of MI, History of positive exercuse test, Current chest paint considered due to myocardial ischemia, Use of nitrate therapy, ECG with pathological Q Waves) 0 filed at 02/11/2025 0958 0 filed at 10/23/2024 1043   History of congestive heart failure (pulmonary edemia, bilateral rales or S3 gallop, Paroxysmal nocturnal dyspnea, CXR showing pulmonary vascular redistribution) 0 filed at 02/11/2025 0958 0 filed at 10/23/2024 1043   History of cerebrovascular disease (Prior TIA or stroke) 0 filed at 02/11/2025 0958 0 filed at 10/23/2024 1043   Pre-operative insulin treatment 0 filed at 02/11/2025 0958 0 filed at 10/23/2024 1043   Pre-operative creatinine>2 mg/dl 0 filed at 02/11/2025 0958 0 filed at 10/23/2024 1043   Revised Cardiac Risk Calculator 0 filed at 02/11/2025 0958 0 filed at 10/23/2024 1043          Apfel Simplified Score      Flowsheet Row Pre-Admission Testing from 2/11/2025 in [facility] Pre-Admission Testing from 10/23/2024 in [facility]   Smoking status 1 filed at 02/11/2025 0959 1 filed at 10/23/2024 1043   History of motion sickness or PONV  0 filed at 02/11/2025 0959 1 filed at 10/23/2024 1043   Use of postoperative opioids 1 filed at 02/11/2025 0959 0 filed at 10/23/2024 1043   Gender - Female 0=No filed at 02/11/2025 0959 0=No filed at 10/23/2024 1043   Apfel Simplified Score Calculator 2 filed at 02/11/2025 0959 2 filed at 10/23/2024 1043          Risk Analysis Index Results This Encounter         2/11/2025  0929             Do you live in a place other than your own home?: 0    When did you  begin living in the place you are currently residing?: Greater than one year ago    Any kidney failure, kidney not working well, or seeing a kidney doctor (nephrologist)? If yes, was this for kidney stones or another problem?: 0 No    Any history of chronic (long-term) congestive heart failure (CHF)?: 0 No    Any shortness of breath when resting?: 0 No    In the past five years, have you been diagnosed with or treated for cancer?: No    During the last 3 months has it become difficult for you to remember things or organize your thoughts?: 0 No    Have you lost weight of 10 pounds or more in the past 3 months without trying?: 0 No    Do you have any loss of appetitie?: 0 No    Getting Around (Mobility): 0 Can get around without help    Eatin Can plan and prepare own meals    Toiletin Can use toilet without any help    Personal Hygiene (Bathing, Hand Washing, Changing Clothes): 0 Can shower or bathe without any help    MILLAN Cancer History: Patient does not indicate history of cancer    Total Risk Analysis Index Score Without Cancer: 17    Total Risk Analysis Index Score: 17          Stop Bang Score      Flowsheet Row Pre-Admission Testing from 2025 in [facility] Admission (Discharged) from 2024 in [facility] OR with James Mcginnis, DO   Do you snore loudly? 0 filed at 2025 1 filed at 2024 0846   Do you often feel tired or fatigued after your sleep? 0 filed at 2025 1 filed at 2024 0846   Has anyone ever observed you stop breathing in your sleep? 0 filed at 2025 1 filed at 2024 0846   Do you have or are you being treated for high blood pressure? 0 filed at 2025 0 filed at 2024 0846   Recent BMI (Calculated) 33.8 filed at 2025 33.8 filed at 2024 0846   Is BMI greater than 35 kg/m2? 0=No filed at 2025 0=No filed at 2024 0846   Age older than 50 years old? 1=Yes filed at  02/11/2025 0928 1=Yes filed at 11/06/2024 0846   Is your neck circumference greater than 17 inches (Male) or 16 inches (Female)? 1 filed at 02/11/2025 0928 1 filed at 11/06/2024 0846   Gender - Male 1=Yes filed at 02/11/2025 0928 1=Yes filed at 11/06/2024 0846   STOP-BANG Total Score 3 filed at 02/11/2025 0928 6 filed at 11/06/2024 0846          Prodigy: High Risk  Total Score: 8              Prodigy Gender Score          ARISCAT Score for Postoperative Pulmonary Complications      Flowsheet Row Pre-Admission Testing from 10/23/2024 in [facility]   Age Calculated Score 3 filed at 10/23/2024 1042   Preoperative SpO2 0 filed at 10/23/2024 1042   Respiratory infection in the last month Either upper or lower (i.e., URI, bronchitis, pneumonia), with fever and antibiotic treatment 0 filed at 10/23/2024 1042   Preoperative anemia (Hgb less than 10 g/dl) 0 filed at 10/23/2024 1042   Surgical incision  0 filed at 10/23/2024 1042   Duration of surgery  16 filed at 10/23/2024 1042   Emergency Procedure  0 filed at 10/23/2024 1042   ARISCAT Total Score  13 filed at 10/23/2024 1042          Ware Perioperative Risk for Myocardial Infarction or Cardiac Arrest (DOMINIC)      Flowsheet Row Pre-Admission Testing from 10/23/2024 in [facility]   Calculated Age Score 1.06 filed at 10/23/2024 1042   Functional Status  0 filed at 10/23/2024 1042   ASA Class  -3.29 filed at 10/23/2024 1042   Creatinine 0 filed at 10/23/2024 1042   Type of Procedure  0.80 filed at 10/23/2024 1042   DOMINIC Total Score  -2.49 filed at 10/23/2024 1042              Assessment & Plan:    54 y.o.  male  scheduled for GIOVANNI TOTAL HIP ARTHROPLASTY - Left  on 2/25/25 with Dr. Mcginnis for Primary osteoarthritis of left hip .  PMHX includes Asthma, KYRIE with Cpap, hearing loss.  PAT consulted for perioperative risk stratification and optimization    Neuro:  No neurologic diagnosis or significant findings on chart review, clinical presentation  and evaluation.  No grossly apparent neurologic perioperative risk.  age  Patient is not at increased risk for perioperative CVA      HEENT:  Hx hearing loss, use of aids    Cardiovascular:  No CV diagnosis or significant findings on chart review or clinical presentation and evaluation. No further preoperative testing or intervention is indicated at this time.  METS: 5.7  RCRI: 0 points, 3.9%  risk for postoperative MACE   DOMINIC: 0.1% risk for 30 day postoperative MACE  EKG - 10/23/24  Normal sinus rhythm  Normal ECG  No previous ECGs available  Confirmed by Huey Espinosa ([id number]) on 10/23/2024 4:18:00 PM    Pulmonary:  Hx keara, KYRIE with Cpap  obesity, duration of surgery > 2 hours  Stop Bang score is 3 placing patient at internediate risk for KYRIE  ARISCAT: <26 points, 1.6% risk of in-hospital postoperative pulmonary complication  PRODIGY: High risk for opioid induced respiratory depression    Pulmonary education discussed. Patient provided deep breathing exercises and educational handout.      Urological/Renal  No diagnosis or significant findings on chart review or clinical presentation and evaluation.     Endocrine:  No endocrine diagnosis or significant findings on chart review or clinical presentation and evaluation. No further testing or intervention is indicated at this time.    A1C 10/23/24 4.7    Hematologic:  Antiplatelet management   The patient is not currently receiving antiplatelet therapy.  Anticoagulation management  The patient is not currently receiving anticoagulation therapy. Patient provided with DVT educational handout.    Caprini Score 12, patient at high risk for perioperative DVT.  Patient provided with VTE education/handout.    Gastrointestinal:   No GI diagnosis or significant findings on chart review or clinical presentation and evaluation.   Eat-10 score 0      Infectious disease:   No infectious diagnosis or significant findings on chart review or clinical presentation and evaluation.    Prescription provided for CHG body wash and dental rinse. CHG use instructions reviewed and provided to patient.  Staph screen collected    Musculoskeletal:   Primary osteoarthritis of left hip  CT Left Hip 1/28/25  IMPRESSION:  1. Severe degenerative change of the left hip.  2. Findings most compatible with surgical scarring with small fluid  collection likely a postoperative seroma along the prior incision  tract for of the right hip arthroplasty.  3. Region of increased density in the right inferior pubic ramus into  the pubic body. This is felt to be similar to the prior exam and  overall an indolent etiology is favored. May represent reactive  sclerosis. Indolent fibro-osseous lesion of bone such as a  nonossifying fibroma or a bone island may also be in the differential  diagnosis.  4. Other findings as above.    Anesthesia/Airway:  No anesthesia complications      Medication instructions and NPO guidelines reviewed with the patient.  All questions or concerns discussed and addressed.      Labs ordered   CBC  CMP  MRSA  Peridex

## 2025-02-13 LAB — STAPHYLOCOCCUS SPEC CULT: NORMAL

## 2025-02-20 ENCOUNTER — ANESTHESIA EVENT (OUTPATIENT)
Dept: OPERATING ROOM | Facility: HOSPITAL | Age: 55
End: 2025-02-20
Payer: COMMERCIAL

## 2025-02-24 ENCOUNTER — TELEPHONE (OUTPATIENT)
Dept: INPATIENT UNIT | Facility: HOSPITAL | Age: 55
End: 2025-02-24
Payer: COMMERCIAL

## 2025-02-24 PROCEDURE — RXMED WILLOW AMBULATORY MEDICATION CHARGE

## 2025-02-24 RX ORDER — TRANEXAMIC ACID 100 MG/ML
1000 INJECTION, SOLUTION INTRAVENOUS ONCE
Status: CANCELLED | OUTPATIENT
Start: 2025-02-24 | End: 2025-02-24

## 2025-02-24 RX ORDER — CEFAZOLIN SODIUM 2 G/50ML
2 SOLUTION INTRAVENOUS ONCE
Status: CANCELLED | OUTPATIENT
Start: 2025-02-24 | End: 2025-02-24

## 2025-02-24 NOTE — DISCHARGE INSTRUCTIONS
[facility]                            James Mcginnis D.O.  Phone: [phone]    POSTOPERATIVE INSTRUCTIONS: TOTAL HIP & TOTAL KNEE ARTHROPLASTY  General/highlights: Avoid straight leg raise or similar.  Wear the MAR hose during the daytime for the next 14 days, remove at night.  Flex/tighten the muscles in the buttocks, thighs and calves often when in chair or bed.  Utilize the incentive spirometer often especially the next 3 days.  Walk at least 10 steps every hour that you are awake.  Take stairs 1 at a time, good leg leads up, bed leg legs down, use the handrails.  You may be weightbearing as tolerated, in general the walker is used for 2 weeks.  PAIN, SWELLING & BRUISING  Some pain, stiffness and swelling is normal for up to 1 year after surgery.  Pain will start to let up over time depending on your activity level.  It is preferable to rest for 24 hours following your surgery  Pain is often delayed for 24-48 hours after surgery.  Pain may be dull/achy, throbbing, or even sharp/nerve sensations  It is normal for swelling and bruising to worsen before it gets better.  These symptoms usually peak 1 week after surgery  Swelling and bruising may appear throughout the leg, all the way down to your toes  Wear your compression stockings every day during the day for 14 days and remove them at night.  This will help control swelling    WOUND CARE INSTRUCTIONS  The surgical dressing that is applied at the time of surgery it needs to stay on the skin for at least 72 hours.  Once you receive your encore collagen dressings first read the instructions, become familiarized with the application process.  Once you are comfortable with that you may remove your postoperative dressing and start the collagen dressings on a daily basis.  You may have sutures under the skin that dissolve on their own over time.    As the sutures absorb occasionally a small suture abscess can develop, this is not  uncommon for up to 6 weeks, if this occurs please notify your surgeon immediately.    HYGIENE  You may shower 24 hours after your surgery, provided the Mepilex silver dressing is in place.  No tub bathing or submerging underwater.  Do not scrub directly over the surgical bandage  Do not use any creams, lotions or ointments on the surgical leg for 4 weeks after surgery, or until you have been cleared to do so by your surgeon    GENERAL INSTRUCTIONS  Do not drink alcoholic beverages (beer and wine included) for 24 hours following your surgery, or while you are taking narcotic pain medications  Delay making important decisions until you are fully recovered  You cannot swim or submerge in water for at least 6 weeks after surgery, or until you are cleared by your surgeon.  You may start kneeling 3 months after knee replacement surgery, once you have been cleared by your surgeon.  This may not ever feel “normal” or comfortable    HOME DIET  Resume your normal diet after surgery. If you are on a specific type of diet for your condition, resume that instead.    Choose foods that help promote good bowel habits and prevent constipation, such as foods high in fiber.    POSTOPERATIVE MEDICATIONS  Pain medications have been ordered to help manage pain throughout recovery.  While you are using narcotic pain medication, you should be using a stool softener or laxative to prevent constipation.  My preference is parallax powder once or twice a day when taking the narcotic pain medicine  It is important to eat a small meal or snack before taking pain medications to avoid nausea or stomach upset.    MEDICATION REFILLS - [phone]  If you need to request a medication refill, please call the office between 8:30am-4:30pm, Monday through Friday.    Any calls received outside of this timeframe will be handled on the next business day.    Medication requests received on Saturday or Sunday will be handled on Monday.    Please allow 3-5  "business days for all medication requests to be processed.    RESTARTING HOME MEDICATIONS  You may restart your home medications the following day after your surgery UNLESS you have been given alternate instructions.    Follow the instructions given to you on your hospital discharge instructions for more information regarding your home medications.    ASSISTIVE DEVICE & MOVEMENT  Initially, you will use a walker or crutches to walk for the first 2 weeks.  Once your therapist feels you are ready, you will wean to one crutch or cane followed by no assistive device.  It is important to keep moving throughout recovery.  Walk at least 10 steps every hour that you are awake.  Stairs are part of your recovery, the \"good \"leg leads on the way up, the \"bad \"leg leads on the way down to, use the handrails and not the walker or crutches.  You should be up and walking around several times per day as well as bending your knee and making sure your knee is going completely straight (for knee replacements).  For anterior hip replacements avoid straight leg raise.    NUMBNESS/CLICKING  Decreased sensation or numbness is common on or around the area of the incision due to sensory nerves that are affected at the time of surgery.  The area of numbness will be lateral to the incision  You might always have numbness but the size of the area of numbness should decrease with time.  It is common for patients to have a “click” in the knee with movement.  This is usually nothing to be concerned about.  There are several reasons why your knee can be making these noises, including the implant components rubbing against each other, or a tendons going over a bony prominence.  Grinding can also occur and is not out of the ordinary.  Grinding can be caused by scar tissue formation  Noises will often settle over time once muscle strength improves.    DIFFICULTY SLEEPING  It is very common for patients to have difficulty sleeping at night which can " be caused pain, medication, or feeling of anxiety.  Sleep disturbance typically worsens 4-6 weeks after surgery.  You are permitted to sleep on your back or side with a  pillow between your legs for comfort    DRIVING & TRAVEL  Your surgeon will address this at your post-op appointment.  You must not be taking narcotic pain medication to be cleared to drive.  LEFT LEG JOINT REPLACMENT:  You may drive once you have regained full control of your leg, typically around 2 week after surgery  RIGHT LEG JOINT REPLACEMENT:  You must speak with your surgeon before you resume driving.  Driving can typically be resumed by 4-6 weeks after surgery.  During long distance travel, you should attempt to change position or stand every hour.  You should complete ankle pumps throughout your travel if you are sitting for long periods of time.  If traveling within the first 2 weeks after surgery, you should wear your compression stockings.    DENTAL & OTHER PROCEDURES  All patients must wait a minimum of 3 months for elective procedures, including routine dental cleanings.  For any dental appointment - cleaning or dental procedures - patients must take a prophylactic antibiotic 1 hour before the appointment.  You will also need to call for an antibiotic prior to any other invasive test, procedure, or surgery.  These prophylactic antibiotics will be needed for the rest of your life, in order to prevent infections.  Please call your surgeons office at [phone] to request the antibiotic.      PHYSICAL THERAPY  Following surgery it is important to progress through recovery with in-home or outpatient physical therapy.  You should continue to complete home exercises provided from the hospital on days that you are not working with a physical therapist.  It is common to have a temporary increase in pain and swelling upon starting outpatient physical therapy and/or changing your exercise routine.  Continue to use ice to help with symptoms.      FOLLOW-UP APPOINTMENT - [phone]  Your post-surgical appointments will take place approximately 2 weeks, 6 weeks, 3 months and 1 year following surgery.  Joint replacements are monitored thereafter every 2-5 years for life.  If you have any questions or need to make changes to this appointment, please contact the office:    EMERGENCIES & WHEN TO CALL YOUR SURGEON  When to contact our office immediately:  Any falls or injury to the joint replacement  Fever >101.5 for at least 48 hours after surgery or chills.  Excessive bleeding from incision(s). A small amount of drainage is normal and expected.  Signs of infection of incision(s)-excessive drainage that is soaking through your dressing (especially if it is pus-like), redness that is spreading out from the edges of your incision, or increased warmth around the area.  Excruciating pain for which the pain medication, taken as instructed, is not helping.  Severe calf pain.  Go directly to the emergency room or call 911, if you are experiencing chest pain or difficulty breathing.    ICE/COLD THERAPY  Ice is most important during the first 2 weeks after surgery, but should be used for several weeks as needed.  Never place ice, or cold therapy devices directly on the skin.  You should always have a protective layer between your skin and the cold.  You have been prescribed to ice your total joint at a minimum of twice per hour for 20 minutes while awake during the first 6 weeks after surgery if you are using ice packs. This will help with pain control.  If you are using an ice machine, please follow ice machine instructions.  After knee replacement is extremely important to elevate the leg straight on an incline, not flat.    COLD THERAPY MACHINE RECOMMENDATIONS  Cold therapy devices can be used before and after surgery to assist in comfort and help to reduce pain and swelling.  These devices differ from ice or ice packs whereas the mechanism circulates water through  tubing and a pad to provide longer periods of cold therapy to the desired site.  While in the hospital, you can use your cold devices around the clock for optimal comfort.  We recommend using cold therapy after working with therapy or completing exercises on your own.  Once you are discharged home, there is no set schedule in which you must follow while using cold therapy.  Below are a few points to remember when using a cold therapy device:    Read the 's instructions prior to first the use.  Follow instructions for filling the cooler (water first, then ice).  Always make sure there is a layer of protection between the cold pad and your skin (Clothing, Towel, Ace Bandage, etc.)  Allow the device to circulate cold water throughout the pad prior wrapping the pad around your leg (approximately 10 minutes).  Place the pad on your leg in the desired position to meet your pain management needs and use the wraps provided to secure the pad to your body.  The purpose of this device is to use consistently throughout the day.  You do not need to need to use the 20 on, 20 off method when using an ice machine.  During waking hours, remove the cold pad every 1-2 hours to perform a skin check  Detach the pad from the cooler and ambulate at least once every hour  After removing the pad, allow at least 30 minutes before resuming cold therapy  You may wear the cold therapy device during periods of sleep including overnight    If you wake up during the night, you can check the skin at this time.  You do not need to wake up specifically to perform skin checks.  Empty the cooler and pad when device is not in use.  Follow 's instructions for cleaning your cold therapy device.    [facility] can assist with problems related to products purchased through the hospital  [phone] - [place]

## 2025-02-24 NOTE — PREADMISSION SCREENING NOTE
Pre Surgery Discharge Planning :    Procedure:  JULIAN    Date of procedure:  2/25/2025    Address you will be discharged to:  HOME:  Address   [address] [place] [address]          Care Partner:  WIFE/ DAYANNA    Current mobility status:  ID    Stairs into house: 2    Stairs inside house: 0    DME:   WALKER, CANE, ICE MACHINE    Joint Class:  [facility]    Same Day Surgery:  YES

## 2025-02-25 ENCOUNTER — HOME HEALTH ADMISSION (OUTPATIENT)
Dept: HOME HEALTH SERVICES | Facility: HOME HEALTH | Age: 55
End: 2025-02-25
Payer: COMMERCIAL

## 2025-02-25 ENCOUNTER — HOSPITAL ENCOUNTER (OUTPATIENT)
Facility: HOSPITAL | Age: 55
Setting detail: OUTPATIENT SURGERY
Discharge: HOME HEALTH | End: 2025-02-25
Attending: ORTHOPAEDIC SURGERY | Admitting: ORTHOPAEDIC SURGERY
Payer: COMMERCIAL

## 2025-02-25 ENCOUNTER — DOCUMENTATION (OUTPATIENT)
Dept: HOME HEALTH SERVICES | Facility: HOME HEALTH | Age: 55
End: 2025-02-25
Payer: COMMERCIAL

## 2025-02-25 ENCOUNTER — ANESTHESIA (OUTPATIENT)
Dept: OPERATING ROOM | Facility: HOSPITAL | Age: 55
End: 2025-02-25
Payer: COMMERCIAL

## 2025-02-25 ENCOUNTER — APPOINTMENT (OUTPATIENT)
Dept: RADIOLOGY | Facility: HOSPITAL | Age: 55
End: 2025-02-25
Payer: COMMERCIAL

## 2025-02-25 ENCOUNTER — PHARMACY VISIT (OUTPATIENT)
Dept: PHARMACY | Facility: CLINIC | Age: 55
End: 2025-02-25
Payer: COMMERCIAL

## 2025-02-25 VITALS
HEART RATE: 97 BPM | BODY MASS INDEX: 36.23 KG/M2 | RESPIRATION RATE: 20 BRPM | WEIGHT: 273.37 LBS | DIASTOLIC BLOOD PRESSURE: 94 MMHG | SYSTOLIC BLOOD PRESSURE: 145 MMHG | TEMPERATURE: 97.3 F | OXYGEN SATURATION: 95 % | HEIGHT: 73 IN

## 2025-02-25 DIAGNOSIS — M16.12 PRIMARY OSTEOARTHRITIS OF LEFT HIP: Primary | ICD-10-CM

## 2025-02-25 PROCEDURE — 2500000004 HC RX 250 GENERAL PHARMACY W/ HCPCS (ALT 636 FOR OP/ED): Performed by: ORTHOPAEDIC SURGERY

## 2025-02-25 PROCEDURE — 3700000002 HC GENERAL ANESTHESIA TIME - EACH INCREMENTAL 1 MINUTE: Performed by: ORTHOPAEDIC SURGERY

## 2025-02-25 PROCEDURE — 3600000018 HC OR TIME - INITIAL BASE CHARGE - PROCEDURE LEVEL SIX: Performed by: ORTHOPAEDIC SURGERY

## 2025-02-25 PROCEDURE — 2500000005 HC RX 250 GENERAL PHARMACY W/O HCPCS: Performed by: ORTHOPAEDIC SURGERY

## 2025-02-25 PROCEDURE — C1776 JOINT DEVICE (IMPLANTABLE): HCPCS | Performed by: ORTHOPAEDIC SURGERY

## 2025-02-25 PROCEDURE — A27130 PR TOTAL HIP ARTHROPLASTY: Performed by: ANESTHESIOLOGY

## 2025-02-25 PROCEDURE — 97110 THERAPEUTIC EXERCISES: CPT | Mod: GP

## 2025-02-25 PROCEDURE — 2500000004 HC RX 250 GENERAL PHARMACY W/ HCPCS (ALT 636 FOR OP/ED): Performed by: ANESTHESIOLOGY

## 2025-02-25 PROCEDURE — 97161 PT EVAL LOW COMPLEX 20 MIN: CPT | Mod: GP

## 2025-02-25 PROCEDURE — 2720000007 HC OR 272 NO HCPCS: Performed by: ORTHOPAEDIC SURGERY

## 2025-02-25 PROCEDURE — A6213 FOAM DRG >16<=48 SQ IN W/BDR: HCPCS | Performed by: ORTHOPAEDIC SURGERY

## 2025-02-25 PROCEDURE — 2500000004 HC RX 250 GENERAL PHARMACY W/ HCPCS (ALT 636 FOR OP/ED): Performed by: NURSE ANESTHETIST, CERTIFIED REGISTERED

## 2025-02-25 PROCEDURE — A27130 PR TOTAL HIP ARTHROPLASTY: Performed by: NURSE ANESTHETIST, CERTIFIED REGISTERED

## 2025-02-25 PROCEDURE — 3700000001 HC GENERAL ANESTHESIA TIME - INITIAL BASE CHARGE: Performed by: ORTHOPAEDIC SURGERY

## 2025-02-25 PROCEDURE — 72170 X-RAY EXAM OF PELVIS: CPT

## 2025-02-25 PROCEDURE — 2780000003 HC OR 278 NO HCPCS: Performed by: ORTHOPAEDIC SURGERY

## 2025-02-25 PROCEDURE — 2500000005 HC RX 250 GENERAL PHARMACY W/O HCPCS: Performed by: ANESTHESIOLOGY

## 2025-02-25 PROCEDURE — 2500000005 HC RX 250 GENERAL PHARMACY W/O HCPCS: Performed by: NURSE ANESTHETIST, CERTIFIED REGISTERED

## 2025-02-25 PROCEDURE — 7100000009 HC PHASE TWO TIME - INITIAL BASE CHARGE: Performed by: ORTHOPAEDIC SURGERY

## 2025-02-25 PROCEDURE — 7100000010 HC PHASE TWO TIME - EACH INCREMENTAL 1 MINUTE: Performed by: ORTHOPAEDIC SURGERY

## 2025-02-25 PROCEDURE — 3600000017 HC OR TIME - EACH INCREMENTAL 1 MINUTE - PROCEDURE LEVEL SIX: Performed by: ORTHOPAEDIC SURGERY

## 2025-02-25 PROCEDURE — 7100000002 HC RECOVERY ROOM TIME - EACH INCREMENTAL 1 MINUTE: Performed by: ORTHOPAEDIC SURGERY

## 2025-02-25 PROCEDURE — 7100000001 HC RECOVERY ROOM TIME - INITIAL BASE CHARGE: Performed by: ORTHOPAEDIC SURGERY

## 2025-02-25 DEVICE — TRIDENT X3 0 DEGREE POLYETHYLENE INSERT
Type: IMPLANTABLE DEVICE | Site: HIP | Status: FUNCTIONAL
Brand: TRIDENT X3 INSERT

## 2025-02-25 DEVICE — TRIDENT II TRITANIUM CLUSTER 56F
Type: IMPLANTABLE DEVICE | Site: HIP | Status: FUNCTIONAL
Brand: TRIDENT II

## 2025-02-25 DEVICE — HIP STEM - HIGH OFFSET
Type: IMPLANTABLE DEVICE | Site: HIP | Status: FUNCTIONAL
Brand: INSIGNIA

## 2025-02-25 DEVICE — CERAMIC V40 FEMORAL HEAD
Type: IMPLANTABLE DEVICE | Site: HIP | Status: FUNCTIONAL
Brand: BIOLOX

## 2025-02-25 RX ORDER — NORETHINDRONE AND ETHINYL ESTRADIOL 0.5-0.035
KIT ORAL AS NEEDED
Status: DISCONTINUED | OUTPATIENT
Start: 2025-02-25 | End: 2025-02-25

## 2025-02-25 RX ORDER — ROCURONIUM BROMIDE 10 MG/ML
INJECTION, SOLUTION INTRAVENOUS AS NEEDED
Status: DISCONTINUED | OUTPATIENT
Start: 2025-02-25 | End: 2025-02-25

## 2025-02-25 RX ORDER — CEFAZOLIN 1 G/1
INJECTION, POWDER, FOR SOLUTION INTRAVENOUS AS NEEDED
Status: DISCONTINUED | OUTPATIENT
Start: 2025-02-25 | End: 2025-02-25

## 2025-02-25 RX ORDER — ACETAMINOPHEN 325 MG/1
650 TABLET ORAL EVERY 6 HOURS PRN
COMMUNITY

## 2025-02-25 RX ORDER — LIDOCAINE HYDROCHLORIDE 20 MG/ML
INJECTION, SOLUTION INFILTRATION; PERINEURAL AS NEEDED
Status: DISCONTINUED | OUTPATIENT
Start: 2025-02-25 | End: 2025-02-25

## 2025-02-25 RX ORDER — MIDAZOLAM HYDROCHLORIDE 1 MG/ML
INJECTION INTRAMUSCULAR; INTRAVENOUS AS NEEDED
Status: DISCONTINUED | OUTPATIENT
Start: 2025-02-25 | End: 2025-02-25

## 2025-02-25 RX ORDER — HYDROMORPHONE HYDROCHLORIDE 2 MG/ML
INJECTION, SOLUTION INTRAMUSCULAR; INTRAVENOUS; SUBCUTANEOUS AS NEEDED
Status: DISCONTINUED | OUTPATIENT
Start: 2025-02-25 | End: 2025-02-25

## 2025-02-25 RX ORDER — ONDANSETRON HYDROCHLORIDE 2 MG/ML
8 INJECTION, SOLUTION INTRAVENOUS ONCE
Status: DISCONTINUED | OUTPATIENT
Start: 2025-02-25 | End: 2025-02-25 | Stop reason: HOSPADM

## 2025-02-25 RX ORDER — FENTANYL CITRATE 50 UG/ML
INJECTION, SOLUTION INTRAMUSCULAR; INTRAVENOUS AS NEEDED
Status: DISCONTINUED | OUTPATIENT
Start: 2025-02-25 | End: 2025-02-25

## 2025-02-25 RX ORDER — SODIUM CHLORIDE, SODIUM LACTATE, POTASSIUM CHLORIDE, CALCIUM CHLORIDE 600; 310; 30; 20 MG/100ML; MG/100ML; MG/100ML; MG/100ML
20 INJECTION, SOLUTION INTRAVENOUS CONTINUOUS
Status: DISCONTINUED | OUTPATIENT
Start: 2025-02-25 | End: 2025-02-25 | Stop reason: HOSPADM

## 2025-02-25 RX ORDER — ALBUTEROL SULFATE 0.83 MG/ML
2.5 SOLUTION RESPIRATORY (INHALATION) ONCE AS NEEDED
Status: DISCONTINUED | OUTPATIENT
Start: 2025-02-25 | End: 2025-02-25 | Stop reason: HOSPADM

## 2025-02-25 RX ORDER — ACETAMINOPHEN 325 MG/1
650 TABLET ORAL EVERY 4 HOURS PRN
Status: DISCONTINUED | OUTPATIENT
Start: 2025-02-25 | End: 2025-02-25 | Stop reason: HOSPADM

## 2025-02-25 RX ORDER — PROPOFOL 10 MG/ML
INJECTION, EMULSION INTRAVENOUS AS NEEDED
Status: DISCONTINUED | OUTPATIENT
Start: 2025-02-25 | End: 2025-02-25

## 2025-02-25 RX ORDER — TRANEXAMIC ACID 100 MG/ML
INJECTION, SOLUTION INTRAVENOUS AS NEEDED
Status: DISCONTINUED | OUTPATIENT
Start: 2025-02-25 | End: 2025-02-25

## 2025-02-25 RX ORDER — PHENYLEPHRINE HCL IN 0.9% NACL 0.4MG/10ML
SYRINGE (ML) INTRAVENOUS AS NEEDED
Status: DISCONTINUED | OUTPATIENT
Start: 2025-02-25 | End: 2025-02-25

## 2025-02-25 RX ORDER — SODIUM CHLORIDE, SODIUM LACTATE, POTASSIUM CHLORIDE, CALCIUM CHLORIDE 600; 310; 30; 20 MG/100ML; MG/100ML; MG/100ML; MG/100ML
100 INJECTION, SOLUTION INTRAVENOUS CONTINUOUS
Status: ACTIVE | OUTPATIENT
Start: 2025-02-25 | End: 2025-02-25

## 2025-02-25 RX ORDER — SODIUM CHLORIDE 0.9 G/100ML
INJECTION, SOLUTION IRRIGATION AS NEEDED
Status: DISCONTINUED | OUTPATIENT
Start: 2025-02-25 | End: 2025-02-25 | Stop reason: HOSPADM

## 2025-02-25 RX ADMIN — EPHEDRINE SULFATE 10 MG: 50 INJECTION, SOLUTION INTRAVENOUS at 11:45

## 2025-02-25 RX ADMIN — Medication 80 MCG: at 11:55

## 2025-02-25 RX ADMIN — SUGAMMADEX 200 MG: 100 INJECTION, SOLUTION INTRAVENOUS at 13:15

## 2025-02-25 RX ADMIN — ROCURONIUM BROMIDE 60 MG: 10 INJECTION, SOLUTION INTRAVENOUS at 11:06

## 2025-02-25 RX ADMIN — PROPOFOL 200 MG: 10 INJECTION, EMULSION INTRAVENOUS at 11:06

## 2025-02-25 RX ADMIN — DEXAMETHASONE SODIUM PHOSPHATE 8 MG: 4 INJECTION INTRA-ARTICULAR; INTRALESIONAL; INTRAMUSCULAR; INTRAVENOUS; SOFT TISSUE at 11:15

## 2025-02-25 RX ADMIN — FENTANYL CITRATE 50 MCG: 50 INJECTION, SOLUTION INTRAMUSCULAR; INTRAVENOUS at 12:11

## 2025-02-25 RX ADMIN — POVIDONE-IODINE 1 APPLICATION: 5 SOLUTION TOPICAL at 09:40

## 2025-02-25 RX ADMIN — ROCURONIUM BROMIDE 10 MG: 10 INJECTION, SOLUTION INTRAVENOUS at 12:12

## 2025-02-25 RX ADMIN — ROCURONIUM BROMIDE 20 MG: 10 INJECTION, SOLUTION INTRAVENOUS at 11:45

## 2025-02-25 RX ADMIN — FENTANYL CITRATE 50 MCG: 50 INJECTION, SOLUTION INTRAMUSCULAR; INTRAVENOUS at 10:54

## 2025-02-25 RX ADMIN — TRANEXAMIC ACID 1000 MG: 1 INJECTION, SOLUTION INTRAVENOUS at 11:11

## 2025-02-25 RX ADMIN — LIDOCAINE HYDROCHLORIDE 100 MG: 20 INJECTION, SOLUTION INFILTRATION; PERINEURAL at 11:06

## 2025-02-25 RX ADMIN — PROPOFOL 50 MCG/KG/MIN: 10 INJECTION, EMULSION INTRAVENOUS at 11:15

## 2025-02-25 RX ADMIN — HYDROMORPHONE HYDROCHLORIDE 0.4 MG: 2 INJECTION, SOLUTION INTRAMUSCULAR; INTRAVENOUS; SUBCUTANEOUS at 13:18

## 2025-02-25 RX ADMIN — HYDROMORPHONE HYDROCHLORIDE 0.4 MG: 2 INJECTION, SOLUTION INTRAMUSCULAR; INTRAVENOUS; SUBCUTANEOUS at 13:20

## 2025-02-25 RX ADMIN — ROCURONIUM BROMIDE 20 MG: 10 INJECTION, SOLUTION INTRAVENOUS at 12:33

## 2025-02-25 RX ADMIN — CEFAZOLIN 3 G: 1 INJECTION, POWDER, FOR SOLUTION INTRAMUSCULAR; INTRAVENOUS at 11:13

## 2025-02-25 RX ADMIN — Medication 6 L/MIN: at 13:33

## 2025-02-25 RX ADMIN — SODIUM CHLORIDE, POTASSIUM CHLORIDE, SODIUM LACTATE AND CALCIUM CHLORIDE: 600; 310; 30; 20 INJECTION, SOLUTION INTRAVENOUS at 11:45

## 2025-02-25 RX ADMIN — MIDAZOLAM HYDROCHLORIDE 2 MG: 1 INJECTION, SOLUTION INTRAMUSCULAR; INTRAVENOUS at 10:54

## 2025-02-25 RX ADMIN — HYDROMORPHONE HYDROCHLORIDE 0.4 MG: 2 INJECTION, SOLUTION INTRAMUSCULAR; INTRAVENOUS; SUBCUTANEOUS at 13:13

## 2025-02-25 RX ADMIN — SODIUM CHLORIDE, POTASSIUM CHLORIDE, SODIUM LACTATE AND CALCIUM CHLORIDE 100 ML/HR: 600; 310; 30; 20 INJECTION, SOLUTION INTRAVENOUS at 09:39

## 2025-02-25 SDOH — HEALTH STABILITY: MENTAL HEALTH: CURRENT SMOKER: 0

## 2025-02-25 ASSESSMENT — COLUMBIA-SUICIDE SEVERITY RATING SCALE - C-SSRS
6. HAVE YOU EVER DONE ANYTHING, STARTED TO DO ANYTHING, OR PREPARED TO DO ANYTHING TO END YOUR LIFE?: NO
2. HAVE YOU ACTUALLY HAD ANY THOUGHTS OF KILLING YOURSELF?: NO
1. IN THE PAST MONTH, HAVE YOU WISHED YOU WERE DEAD OR WISHED YOU COULD GO TO SLEEP AND NOT WAKE UP?: NO

## 2025-02-25 ASSESSMENT — COGNITIVE AND FUNCTIONAL STATUS - GENERAL
WALKING IN HOSPITAL ROOM: A LITTLE
MOBILITY SCORE: 20
CLIMB 3 TO 5 STEPS WITH RAILING: A LITTLE
STANDING UP FROM CHAIR USING ARMS: A LITTLE
MOVING TO AND FROM BED TO CHAIR: A LITTLE

## 2025-02-25 ASSESSMENT — PAIN SCALES - GENERAL
PAINLEVEL_OUTOF10: 0 - NO PAIN
PAINLEVEL_OUTOF10: 3
PAINLEVEL_OUTOF10: 0 - NO PAIN
PAINLEVEL_OUTOF10: 3
PAINLEVEL_OUTOF10: 0 - NO PAIN

## 2025-02-25 ASSESSMENT — PAIN DESCRIPTION - DESCRIPTORS: DESCRIPTORS: ACHING

## 2025-02-25 ASSESSMENT — PAIN - FUNCTIONAL ASSESSMENT
PAIN_FUNCTIONAL_ASSESSMENT: 0-10

## 2025-02-25 NOTE — ANESTHESIA PREPROCEDURE EVALUATION
"Patient: Claudy Ascencio \"Sumanth\"    Procedure Information       Date/Time: 02/25/25 1020    Procedure: GIOVANNI TOTAL HIP ARTHROPLASTY (Left: Hip) - DIRECT ANTERIOR APPROACH    Location: [facility] OR 04 / [facility]    Surgeons: James Mcginnis DO          There were no vitals filed for this visit.    Past Surgical History:   Procedure Laterality Date    COLONOSCOPY  2022    All clear     Past Medical History:   Diagnosis Date    Asthma 1977    No issues for a long time    CPAP (continuous positive airway pressure) dependence 2022    On cpap for 2 years    Hearing aid worn     7 years  bilateral    HL (hearing loss)     Long time    Joint pain     Several years    Sleep apnea     with CPAP    Wears glasses        Current Facility-Administered Medications:     lactated Ringer's infusion, 100 mL/hr, intravenous, Continuous, Ronnell Cleary MD    lactated Ringer's infusion, 20 mL/hr, intravenous, Continuous, Tommy Arias MD    povidone-iodine 5 % kit kit, , Topical, Once, James Mcginnis,   Prior to Admission medications    Medication Sig Start Date End Date Taking? Authorizing Provider   baclofen (Lioresal) 10 mg tablet take 1 tablet by mouth three times daily as needed muscle spasms 2/24/25      loratadine (Claritin) 10 mg tablet Take 1 tablet (10 mg) by mouth once daily.    Historical Provider, MD   multivitamin tablet Take 1 tablet by mouth once daily.    Historical Provider, MD   naproxen (Naprosyn) 250 mg tablet Take 1 tablet (250 mg) by mouth 2 times a day as needed for mild pain (1 - 3).    Historical Provider, MD   oxyCODONE-acetaminophen (Percocet) 5-325 mg tablet take 1 tablet by mouth every 4-6 hours as needed pain 2/24/25        No Known Allergies  Social History     Tobacco Use    Smoking status: Never    Smokeless tobacco: Never   Substance Use Topics    Alcohol use: Yes     Alcohol/week: 8.0 standard drinks of alcohol     Types: 8 Standard drinks or equivalent per week     Comment: occasionally " "        Chemistry    Lab Results   Component Value Date/Time     02/11/2025 0953    K 4.5 02/11/2025 0953     02/11/2025 0953    CO2 32 02/11/2025 0953    BUN 20 02/11/2025 0953    CREATININE 1.08 02/11/2025 0953    Lab Results   Component Value Date/Time    CALCIUM 9.9 02/11/2025 0953    ALKPHOS 65 02/11/2025 0953    AST 17 02/11/2025 0953    ALT 21 02/11/2025 0953    BILITOT 0.6 02/11/2025 0953          Lab Results   Component Value Date/Time    WBC 6.6 02/11/2025 0953    HGB 16.9 02/11/2025 0953    HCT 49.8 02/11/2025 0953     02/11/2025 0953     No results found for: \"PROTIME\", \"PTT\", \"INR\"  Encounter Date: 10/23/24   ECG 12 Lead   Result Value    Ventricular Rate 72    Atrial Rate 72    SD Interval 168    QRS Duration 74    QT Interval 390    QTC Calculation(Bazett) 427    P Axis 12    R Axis 44    T Axis 32    QRS Count 12    Q Onset 225    P Onset 141    P Offset 195    T Offset 420    QTC Fredericia 414    Narrative    Normal sinus rhythm  Normal ECG  No previous ECGs available  Confirmed by Huey Espinosa ([id number]) on 10/23/2024 4:18:00 PM     No results found for this or any previous visit from the past 1095 days.    Relevant Problems   Pulmonary   (+) Asthma   (+) KYRIE (obstructive sleep apnea)       Clinical information reviewed:                   NPO Detail:  No data recorded     Physical Exam    Airway  Mallampati: II     Cardiovascular - normal exam     Dental    Pulmonary    Abdominal            Anesthesia Plan    History of general anesthesia?: yes  History of complications of general anesthesia?: no    ASA 2     general     The patient is not a current smoker.  Patient was not previously instructed to abstain from smoking on day of procedure.  Patient did not smoke on day of procedure.  Education provided regarding risk of obstructive sleep apnea.  intravenous induction   Anesthetic plan and risks discussed with patient.    Plan discussed with CRNA.      "

## 2025-02-25 NOTE — OP NOTE
"GIOVANNI TOTAL HIP ARTHROPLASTY (L) Operative Note     Date: 2025  OR Location: [facility] OR    Name: Claudy Ascencio \"Sumanth\", : 1970, Age: 54 y.o., MRN: [id number], Sex: male    Diagnosis  Pre-op Diagnosis      * Primary osteoarthritis of left hip [M16.12] Post-op Diagnosis     * Primary osteoarthritis of left hip [M16.12]     Procedures  GIOVANNI TOTAL HIP ARTHROPLASTY  29817 - TX ARTHRP ACETBLR/PROX FEM PROSTC AGRFT/ALGRFT      Surgeons      * James Mcginnis - Primary    Resident/Fellow/Other Assistant:  Surgeons and Role:  * No surgeons found with a matching role *    Staff:   Circulator: Antoinette Joyaub Person: Sukhwinder  Surgical Assistant: Chichi Wilkins Person: Monique Gómez Circulator: Norah    Anesthesia Staff: Anesthesiologist: Tommy Arias MD  CRNA: TANIKA Mckinley-CRNA    Procedure Summary  Anesthesia: General  ASA: II  Estimated Blood Loss: 350 mL  Intra-op Medications:   Administrations occurring from 1020 to 1305 on 25:   Medication Name Total Dose   sodium chloride 0.9 % irrigation solution 3,000 mL   EPINEPHrine (Adrenalin) 0.2 mL, ketorolac (Toradol) 30 mg, morphine PF (Duramorph) 1 mg/mL 5 mg, ropivacaine (Naropin) 5 mg/mL (0.5 %) 30 mL in sodium chloride 0.9% 20 mL syringe 57 mL   ceFAZolin (Ancef) vial 1 g 3 g   dexAMETHasone (Decadron) injection 4 mg/mL 8 mg   ePHEDrine injection 10 mg   fentaNYL (Sublimaze) injection 50 mcg/mL 100 mcg   lidocaine (Xylocaine) injection 2 % 100 mg   midazolam PF (Versed) injection 1 mg/mL 2 mg   phenylephrine 40 mcg/mL syringe 10 mL 80 mcg   propofol (Diprivan) injection 10 mg/mL 541 mg   rocuronium 10 mg/mL 110 mg   tranexamic acid (Cyklokapron) injection 1,000 mg   lactated Ringer's infusion Cannot be calculated              Anesthesia Record               Intraprocedure I/O Totals          Intake    Tranexamic Acid 0.00 mL    The total shown is the total volume documented since Anesthesia Start was filed.    lactated Ringer's infusion 200.00 " "mL    Total Intake 200 mL       Output    Est. Blood Loss 350 mL    Total Output 350 mL       Net    Net Volume -150 mL          Specimen: No specimens collected              Drains and/or Catheters: * None in log *    Tourniquet Times:         Implants:  Implants       Type Name Action Serial No.      Joint SHELL, TRIDENT II, CLUSTERHOLE, 56F - [id number] Implanted      Joint INSERT, TRIDENT X3 POLYETHYLENE, 0 DEG, 36MM F - [id number] Implanted       INSIGNIA HIGH STEM- HIGH OFFSET Implanted      Joint HEAD, FEMUR V40 36MM 0MM BIOLOX DELTA - [id number] Implanted               Findings: Preoperative examination revealed the left leg to be approximately 7 to 8 mm shorter than the right.  After dissection down to the hip severe arthrosis was encountered.  Patient had very thick cortical bone.  After placement of a well-fixed total hip arthroplasty excellent range of motion, excellent stability at the arc of motion, center rotation leg lengths and offset reestablished.    Indications: Claudy Ascencio \"Yamile" is an 54 y.o. male who is having surgery for Primary osteoarthritis of left hip [M16.12].  Patient is here today for robotic assisted left total hip arthroplasty, direct anterior approach    The patient was seen in the preoperative area. The risks, benefits, complications, treatment options, non-operative alternatives, expected recovery and outcomes were discussed with the patient. The possibilities of reaction to medication, pulmonary aspiration, injury to surrounding structures, bleeding, recurrent infection, the need for additional procedures, failure to diagnose a condition, and creating a complication requiring transfusion or operation were discussed with the patient. The patient concurred with the proposed plan, giving informed consent.  The site of surgery was properly noted/marked if necessary per policy. The patient has been actively warmed in preoperative area. Preoperative antibiotics have been " ordered and given within 1 hours of incision. Venous thrombosis prophylaxis are not indicated.    Procedure Details: The patient was correctly identified in preoperative holding, taken to the operative suite and placed in the seated position, a spinal anesthetic was administered.  Appropriate antibiotics and intravenous TXA administered.  The patient was placed in the supine position.  The bilateral lower extremities including the bilateral hips and abdomen were sterilely prepped and draped in the usual surgical fashion using an EZYsuit.  The pelvic array was affixed to the left iliac crest with 3 pins.  A 12 cm longitudinal incision was made starting 3 fingerbreadths distal and lateral to the ASIS descending in line with the lateral epicondyle of the femur.  Dissection was carried through the subcutaneous tissue to the investing fascia of the tensor fascia dayan.  The fascia of the TFL was split over the muscle belly.  The muscle belly was then swept laterally allowing access to the interval.  The ascending branches of the lateral femoral circumflex artery and vein were then identified and coagulated with electrocautery.  The prerectal fascia was incised, pericapsular fat excised.  Extracapsular retractors were then placed.  A T-shaped capsulotomy was then performed, the upper and lower leaflets were tagged with #2 the bond suture.  Initial oxymel release was carried up through the saddle, distal release through the pubofemoral ligament.  The digital ruler was then utilized to assess the neck cut.  The femoral neck cut was made.  The head and neck segments were then removed.  Periacetabular retractors were placed.  Acetabular registration was then performed with excellent accuracy.  Reaming was then performed, excellent depth and rim ream obtained with a 56 meter reamer.  The acetabulum was soaked with Irrisept and thoroughly irrigated.  The final 56 millimeter acetabular cup was then placed in Cicely 40 degrees of  abduction and 20 degrees of anteversion with excellent fitment to the native acetabulum, position confirmed with the Raymond.   A neutral 36 mm polyethylene liner was then affixed to the shell.  The iliac crest array was then removed from the patient.  Attention was then turned to the femur.  Sequential release was performed of the ischiofemoral ligament to allow lateralization and elevation of the femur.  A cookie cutter was utilized to open up the canal, canal finder sounded the canal, stepwise impaction broaching was then performed and carried up to a size 3 broach.  Trialing was then performed.  Best fit, stability, leg length and offset were achieved with a high offset trunnion and a 36 x +0 mm trial femoral head.  The trials were then removed.  The wound was soaked with Irrisept and thoroughly irrigated.  The final size 3 high offset femoral stem was impacted into the femur.  The 36 x +0 mm femoral head was impacted onto the mortise taper and the hip was reduced.     The pin holes were closed with 4-0 Monocryl, Steri-Strips and covered with a Mepilex dressing.  The wound was then thoroughly irrigated, the capsule reapproximated with #1 Vicryl suture, the fascia dayan was closed with #1 Vicryl suture with a watertight closure.  The skin was closed in typical layered fashion, skin edge reapproximated with skin glue, Steri-Strips with adhesive and a Mepilex Ag dressing was applied.  The patient was subsequently lightened from anesthesia, transferred to the spinal bed and taken to the recovery room in stable condition without complication  Complications:  None; patient tolerated the procedure well.    Disposition: PACU - hemodynamically stable.  Condition: stable         Task Performed by RNFA or Surgical Assistant:  Patient positioning, sterile prep and drape, typical intraoperative assist and wound/skin closure          Additional Details: na    Attending Attestation:     James Mcginnis  Phone Number: [phone]

## 2025-02-25 NOTE — NURSING NOTE
Went to PACU to meet with patient and care giver,  I educated them on discharge plans, gave handout of discharge guidelines, discussed DME needs, informed them about  home care appointments and answered all questions.  Plan was for patient to discharge same day.  Patient attended class and I reviewed my contact information and told them I would call in a few days to follow up.

## 2025-02-25 NOTE — PROGRESS NOTES
"Physical Therapy    Physical Therapy Evaluation    Patient Name: Claudy Ascencio \"Sumanth\"  MRN: [id number]  Department:   Room: [facility] OR  Today's Date: 2/25/2025   Time Calculation  Start Time: 1536  Stop Time: 1604  Time Calculation (min): 28 min    Assessment/Plan   PT Assessment  PT Assessment Results: Decreased mobility, Orthopedic restrictions  Rehab Prognosis: Excellent  Barriers to Discharge Home: No anticipated barriers  Evaluation/Treatment Tolerance: Patient tolerated treatment well  Medical Staff Made Aware: Yes  Strengths: Ability to acquire knowledge  End of Session Communication: Bedside nurse  End of Session Patient Position:  (Pt sitting up in his wheelchair waiting for his RN to remove his IV port and then D/C home with his spouse.)     PT Plan  PT Eval Only Reason: Safe to return home  PT Discharge Recommendations: Low intensity level of continued care  Equipment Recommended upon Discharge: Wheeled walker  PT Recommended Transfer Status: Independent  PT - OK to Discharge: Yes (LOW follow up services)    Subjective   General Visit Information:  General  Reason for Referral: 53 yo male admitted 2' to OA L hip, s/p DAA L JULIAN  Referred By: Dr. HAILE Mcginnis  Past Medical History Relevant to Rehab: OA, asthma, sleep apnea with CPAP, hearing aids  Family/Caregiver Present: Yes (spouse)  Prior to Session Communication: Bedside nurse  Patient Position Received: Bed, 3 rail up, Alarm off, not on at start of session  Preferred Learning Style: verbal, visual, written  General Comment: Pt is pleasant and agreeable to work with PT. Pt went over Pt's anterior JULIAN precautions and Pt performed his JULIAN protocol ex's. Pt doesn't need further PT services here as he is D/C home with his spouse today. Recommend LOW follow up services.  Home Living:  Home Living  Type of Home: House  Lives With: Spouse  Home Adaptive Equipment: Walker rolling or standard, Cane  Home Layout: Two level (Pt states that he stays on the " 1st floor)  Home Access: Stairs to enter with rails  Entrance Stairs-Rails: Both  Bathroom Shower/Tub: Walk-in shower  Bathroom Equipment: Shower chair with back, Raised toilet seat with rails  Prior Level of Function:  Prior Function Per Pt/Caregiver Report  Level of [place]: Independent with ADLs and functional transfers, Independent with homemaking with ambulation (no device)  Receives Help From: Family (spouse)  Precautions:  Precautions  LE Weight Bearing Status: Weight Bearing as Tolerated (L LE)  Post-Surgical Precautions: Left hip precautions             Objective   Pain:  Pain Assessment  Pain Assessment: 0-10  0-10 (Numeric) Pain Score: 3  Pain Type: Surgical pain  Pain Location: Hip  Pain Orientation: Left  Cognition:  Cognition  Overall Cognitive Status: Within Functional Limits  Orientation Level: Oriented X4    General Assessments:  General Observation  General Observation: Pt performed the following supine ex's; B AP, L QS, glut sets, L heel slides, L hip abd and L SAQ each x 20 reps               Activity Tolerance  Endurance: Endurance does not limit participation in activity    Sensation  Light Touch: No apparent deficits    Strength  Strength Comments: B UE and LE are WFL  Static Sitting Balance  Static Sitting-Balance Support: Bilateral upper extremity supported  Static Sitting-Level of Assistance: Distant supervision    Static Standing Balance  Static Standing-Balance Support: Bilateral upper extremity supported (wheeled walker)  Static Standing-Level of Assistance: Close supervision  Dynamic Standing Balance  Dynamic Standing-Balance Support: Bilateral upper extremity supported (wheeled walker)  Dynamic Standing-Level of Assistance: Close supervision  Functional Assessments:  Bed Mobility  Bed Mobility: Yes  Bed Mobility 1  Bed Mobility 1: Supine to sitting, Sitting to supine  Level of Assistance 1: Distant supervision    Transfers  Transfer: Yes  Transfer 1  Transfer From 1: Bed  to  Transfer to 1: Stand  Technique 1: Sit to stand, Stand to sit  Transfer Device 1:  (wheeled walker)  Transfer Level of Assistance 1: Close supervision    Ambulation/Gait Training  Ambulation/Gait Training Performed: Yes  Ambulation/Gait Training 1  Surface 1: Level tile  Device 1: Rolling walker  Assistance 1: Close supervision  Quality of Gait 1: Decreased step length (Decreased jason)  Comments/Distance (ft) 1: 100 feet    Stairs  Stairs: Yes  Stairs  Rails 1: Bilateral  Curb Step 1: No  Device 1: No device  Assistance 1: Close supervision  Comment/Number of Steps 1: 3  Extremity/Trunk Assessments:  RUE   RUE : Within Functional Limits  LUE   LUE: Within Functional Limits  RLE   RLE : Within Functional Limits  LLE   LLE : Within Functional Limits  Outcome Measures:  [facility] Basic Mobility  Turning from your back to your side while in a flat bed without using bedrails: None  Moving from lying on your back to sitting on the side of a flat bed without using bedrails: None  Moving to and from bed to chair (including a wheelchair): A little  Standing up from a chair using your arms (e.g. wheelchair or bedside chair): A little  To walk in hospital room: A little  Climbing 3-5 steps with railing: A little  Basic Mobility - Total Score: 20        Education Documentation  Handouts, taught by Gray Barroso PT at 2/25/2025  5:08 PM.  Learner: Patient  Readiness: Eager  Method: Explanation, Demonstration, Handout  Response: Verbalizes Understanding, Demonstrated Understanding    Precautions, taught by Gray Barroso PT at 2/25/2025  5:08 PM.  Learner: Patient  Readiness: Eager  Method: Explanation, Demonstration, Handout  Response: Verbalizes Understanding, Demonstrated Understanding    Home Exercise Program, taught by Gray Barroso PT at 2/25/2025  5:08 PM.  Learner: Patient  Readiness: Eager  Method: Explanation, Demonstration, Handout  Response: Verbalizes Understanding, Demonstrated Understanding    Mobility  Training, taught by Gray Barroso, PT at 2/25/2025  5:08 PM.  Learner: Patient  Readiness: Eager  Method: Explanation, Demonstration, Handout  Response: Verbalizes Understanding, Demonstrated Understanding    Education Comments  No comments found.

## 2025-02-25 NOTE — ANESTHESIA POSTPROCEDURE EVALUATION
"Patient: Claudy Ascencio \"Sumanth\"    Procedure Summary       Date: 02/25/25 Room / Location: [facility] OR 04 / [facility]    Anesthesia Start: 1059 Anesthesia Stop: 1337    Procedure: GIOVANNI TOTAL HIP ARTHROPLASTY (Left: Hip) Diagnosis:       Primary osteoarthritis of left hip      (Primary osteoarthritis of left hip [M16.12])    Surgeons: James Mcginnis DO Responsible Provider: Tommy Arias MD    Anesthesia Type: general ASA Status: 2            Anesthesia Type: general    Vitals Value Taken Time   /89 02/25/25 1333   Temp 36.3 °C (97.3 °F) 02/25/25 1333   Pulse 97 02/25/25 1333   Resp 12 02/25/25 1333   SpO2 97 % 02/25/25 1333       Anesthesia Post Evaluation    Patient location during evaluation: PACU  Patient participation: complete - patient participated  Level of consciousness: awake  Pain management: adequate  Multimodal analgesia pain management approach  Airway patency: patent  Two or more strategies used to mitigate risk of obstructive sleep apnea  Cardiovascular status: acceptable  Respiratory status: acceptable  Hydration status: acceptable  Postoperative Nausea and Vomiting: none        No notable events documented.    "

## 2025-02-25 NOTE — HH CARE COORDINATION
Home Care received a Referral for Physical Therapy. We have processed the referral for a Start of Care on 24 hrs.     If you have any questions or concerns, please feel free to contact us at [phone]. Follow the prompts, enter your five digit zip code, and you will be directed to your care team on [facility].

## 2025-02-25 NOTE — ANESTHESIA PROCEDURE NOTES
Airway  Date/Time: 2/25/2025 11:08 AM  Urgency: elective    Airway not difficult    Staffing  Performed: CRNA   Authorized by: Tommy Arias MD    Performed by: TANIKA Mckinley-IVAN  Patient location during procedure: OR    Indications and Patient Condition  Indications for airway management: anesthesia  Spontaneous Ventilation: absent  Sedation level: deep  Preoxygenated: yes  Patient position: sniffing  Mask difficulty assessment: 2 - vent by mask + OA or adjuvant +/- NMBA    Final Airway Details  Final airway type: endotracheal airway      Successful airway: ETT  Cuffed: yes   Successful intubation technique: video laryngoscopy  Facilitating devices/methods: intubating stylet  Blade: Doreen  Blade size: #4  ETT size (mm): 7.5  Cormack-Lehane Classification: grade I - full view of glottis  Placement verified by: chest auscultation and capnometry   Measured from: lips  ETT to lips (cm): 23  Number of attempts at approach: 1  Number of other approaches attempted: 0

## 2025-02-26 ENCOUNTER — HOME CARE VISIT (OUTPATIENT)
Dept: HOME HEALTH SERVICES | Facility: HOME HEALTH | Age: 55
End: 2025-02-26
Payer: COMMERCIAL

## 2025-02-26 PROCEDURE — G0151 HHCP-SERV OF PT,EA 15 MIN: HCPCS

## 2025-02-26 SDOH — HEALTH STABILITY: PHYSICAL HEALTH: EXERCISE TYPE: ISSUED HEP FOR THA. HE COMPLETED 10 REPS WITH CUES.

## 2025-02-26 SDOH — ECONOMIC STABILITY: HOUSING INSECURITY: HOME SAFETY: ANTERIOR THA

## 2025-02-26 ASSESSMENT — ENCOUNTER SYMPTOMS
PAIN: 1
OCCASIONAL FEELINGS OF UNSTEADINESS: 0
PERSON REPORTING PAIN: PATIENT

## 2025-02-26 ASSESSMENT — ACTIVITIES OF DAILY LIVING (ADL)
OASIS_M1830: 02
ENTERING_EXITING_HOME: SUPERVISION

## 2025-03-01 ENCOUNTER — HOME CARE VISIT (OUTPATIENT)
Dept: HOME HEALTH SERVICES | Facility: HOME HEALTH | Age: 55
End: 2025-03-01
Payer: COMMERCIAL

## 2025-03-03 ENCOUNTER — TELEPHONE (OUTPATIENT)
Dept: INPATIENT UNIT | Facility: HOSPITAL | Age: 55
End: 2025-03-03
Payer: COMMERCIAL

## 2025-04-03 ENCOUNTER — PATIENT OUTREACH (OUTPATIENT)
Dept: SCHEDULING | Age: 55
End: 2025-04-03
Payer: COMMERCIAL

## 2025-04-09 ENCOUNTER — APPOINTMENT (OUTPATIENT)
Dept: PRIMARY CARE | Facility: CLINIC | Age: 55
End: 2025-04-09
Payer: COMMERCIAL

## 2025-04-22 ENCOUNTER — APPOINTMENT (OUTPATIENT)
Dept: PRIMARY CARE | Facility: CLINIC | Age: 55
End: 2025-04-22
Payer: COMMERCIAL

## 2025-04-22 VITALS
SYSTOLIC BLOOD PRESSURE: 105 MMHG | WEIGHT: 259 LBS | HEIGHT: 73 IN | RESPIRATION RATE: 16 BRPM | DIASTOLIC BLOOD PRESSURE: 71 MMHG | BODY MASS INDEX: 34.33 KG/M2 | HEART RATE: 75 BPM

## 2025-04-22 DIAGNOSIS — Z00.00 PHYSICAL EXAM: Primary | ICD-10-CM

## 2025-04-22 DIAGNOSIS — E78.5 HYPERLIPIDEMIA, UNSPECIFIED HYPERLIPIDEMIA TYPE: ICD-10-CM

## 2025-04-22 DIAGNOSIS — J45.902 ASTHMA WITH STATUS ASTHMATICUS, UNSPECIFIED ASTHMA SEVERITY, UNSPECIFIED WHETHER PERSISTENT (HHS-HCC): ICD-10-CM

## 2025-04-22 DIAGNOSIS — E83.119 HEMOCHROMATOSIS, UNSPECIFIED HEMOCHROMATOSIS TYPE: ICD-10-CM

## 2025-04-22 DIAGNOSIS — G47.33 OSA (OBSTRUCTIVE SLEEP APNEA): ICD-10-CM

## 2025-04-22 DIAGNOSIS — Z12.83 SKIN EXAM, SCREENING FOR CANCER: ICD-10-CM

## 2025-04-22 PROCEDURE — 3008F BODY MASS INDEX DOCD: CPT | Performed by: FAMILY MEDICINE

## 2025-04-22 PROCEDURE — G0439 PPPS, SUBSEQ VISIT: HCPCS | Performed by: FAMILY MEDICINE

## 2025-04-22 PROCEDURE — 1036F TOBACCO NON-USER: CPT | Performed by: FAMILY MEDICINE

## 2025-04-22 NOTE — ASSESSMENT & PLAN NOTE
Tolerating CPAP   Orders:    JAK2 V617F Mutation by PCR, Qual; Future    CT cardiac scoring wo IV contrast; Future

## 2025-04-22 NOTE — PROGRESS NOTES
"Subjective   Patient ID: Claudy Ascencio \"Yamile" is a 55 y.o. male who presents for Establish Care.  HPI  R HIP REPLACED NOVEMBER   AND LEFT IN FEBRUARY   RECOVERED WELL   ROBOTIC ASSISTED , 2 WEEKS    DID NOT NEED PT   DID 2 DAYS ON THE FIRST HIP AND THE SECOND HIP , WALKING WITHIN FEW DAYS.   HE HAD BONE SPURS AND OSTEOARTHRITIS     NOW A DAYS , WALKING AND PLAYING GOLF     SEMI RETIRED, PART TIME  , NOW WORKS FEW HOURS a week  , GOLFING  NOW STILL RIDING.     SINCE EHE WAS NOT WALKING   275 BEFORE SURGERY     DOWN 20 POUNDS IN THE LAST MONTH , 30 POUNDS TO GO .       LAST COLONSCOPY 01/13/2021   - The distal rectum and anal verge are normal on                        retroflexion view.                        - The entire examined colon is normal.                        - No specimens collected.   Recommendation:      - Discharge patient to home (ambulatory).                        - Resume regular diet today.                        - Continue present medications.                        - Repeat colonoscopy in 5 years for screening                        purposes.                        - Patient has a contact number available for                        emergencies. The signs and symptoms of potential                        delayed complications were discussed with the                        patient. Return to normal activities tomorrow.                        Written discharge instructions were provided to the                        patient.   Dr. Bennett Villanueva MD     LIVES WITH WIFE   THEY HAVE 2 DAUGHTERS 23 AND 21   YOUNGEST AT [facility] [employer].   MOTHER HAD BREAST CANER, PASSED AWAY IN 2009 , WAS 64 , HAD CANER 10 YEARS BEFORE THAT.     Review of Systems    Medical History[1]    Surgical History[2]   Social History[3]   Family History[4]    MEDICATIONS AND ALLERGIES:    ALLERGIES Patient has no known allergies.    MEDICATIONS   Medications Ordered Prior to Encounter[5]           Objective   Visit " "Vitals  /71   Pulse 75   Resp 16   Ht 1.854 m (6' 1\")   Wt 117 kg (259 lb)   BMI 34.17 kg/m²   Smoking Status Never   BSA 2.45 m²          2/25/2025     1:33 PM 2/25/2025     1:48 PM 2/25/2025     2:03 PM 2/25/2025     2:18 PM 2/25/2025     2:33 PM 2/25/2025     2:48 PM 4/22/2025     2:12 PM   Vitals   Systolic 144 138 155 143 144 145 105   Diastolic 89 75 96 96 97 94 71   Heart Rate 97 97 98 101 102 97 75   Temp 36.3 °C (97.3 °F)         Resp 12 18 20 18 20 20 16   Height       1.854 m (6' 1\")   Weight (lb)       259   BMI       34.17 kg/m2   BSA (m2)       2.45 m2   Visit Report       Report     Physical Exam  Constitutional:       Appearance: Normal appearance. He is normal weight.   HENT:      Head: Normocephalic.      Right Ear: Tympanic membrane normal.      Left Ear: Tympanic membrane normal.      Nose: Nose normal.      Mouth/Throat:      Pharynx: Oropharynx is clear.   Eyes:      Pupils: Pupils are equal, round, and reactive to light.   Cardiovascular:      Rate and Rhythm: Normal rate and regular rhythm.      Pulses: Normal pulses.      Heart sounds: Normal heart sounds.   Pulmonary:      Effort: Pulmonary effort is normal.      Breath sounds: Normal breath sounds. No stridor. No rhonchi.   Musculoskeletal:         General: No swelling or tenderness.   Skin:     Coloration: Skin is not jaundiced or pale.   Neurological:      General: No focal deficit present.      Mental Status: He is alert and oriented to person, place, and time. Mental status is at baseline.      Cranial Nerves: No cranial nerve deficit.      Sensory: No sensory deficit.      Motor: No weakness.      Coordination: Coordination normal.      Gait: Gait normal.      Deep Tendon Reflexes: Reflexes normal.   Psychiatric:         Mood and Affect: Mood normal.         Behavior: Behavior normal.         Thought Content: Thought content normal.         Judgment: Judgment normal.             Assessment & Plan  Physical exam  Uptodate on " colonscopy   Uptodate on vaccines   Will order check lasb eblow     Orders:    CBC and Auto Differential; Future    Comprehensive Metabolic Panel; Future    Hemoglobin A1C; Future    Lipid Panel; Future    Prostate Specific Antigen; Future    TSH with reflex to Free T4 if abnormal; Future    Urinalysis with Reflex Culture and Microscopic; Future    JAK2 V617F Mutation by PCR, Qual; Future    CT cardiac scoring wo IV contrast; Future    Asthma with status asthmaticus, unspecified asthma severity, unspecified whether persistent (HHS-HCC)  Stable   No acute compalaints stable   Orders:    JAK2 V617F Mutation by PCR, Qual; Future    CT cardiac scoring wo IV contrast; Future    KYRIE (obstructive sleep apnea)  Tolerating CPAP   Orders:    JAK2 V617F Mutation by PCR, Qual; Future    CT cardiac scoring wo IV contrast; Future    Hemochromatosis, unspecified hemochromatosis type  Chornic   Will check labs below   Orders:    JAK2 V617F Mutation by PCR, Qual; Future    CT cardiac scoring wo IV contrast; Future    Skin exam, screening for cancer  Uli=vised to follow with dermatology   Orders:    Referral to Dermatology    JAK2 V617F Mutation by PCR, Qual; Future    CT cardiac scoring wo IV contrast; Future    Hyperlipidemia, unspecified hyperlipidemia type  Will order Cardaic scoring CT   Orders:    CT cardiac scoring wo IV contrast; Future                  [1]   Past Medical History:  Diagnosis Date    Allergic 1/1/1973    Asthma 1977    No issues for a long time    CPAP (continuous positive airway pressure) dependence 2022    On cpap for 2 years    Hearing aid worn     7 years  bilateral    HL (hearing loss)     Long time    Joint pain     Several years    Sleep apnea     with CPAP    Visual impairment 1/1/2012    Wears glasses    [2]   Past Surgical History:  Procedure Laterality Date    COLONOSCOPY  2022    All clear    JOINT REPLACEMENT  11/2024   [3]   Social History  Socioeconomic History    Marital status:     Tobacco Use    Smoking status: Never    Smokeless tobacco: Never   Substance and Sexual Activity    Alcohol use: Yes     Alcohol/week: 8.0 standard drinks of alcohol     Types: 8 Standard drinks or equivalent per week     Comment: occasionally    Drug use: Never    Sexual activity: Yes     Partners: Female     Birth control/protection: Post-menopausal, None     Social Drivers of Health     Transportation Needs: No Transportation Needs (2/26/2025)    OASIS : Transportation     Lack of Transportation (Medical): No     Lack of Transportation (Non-Medical): No     Patient Unable or Declines to Respond: No   Social Connections: Feeling Socially Integrated (2/26/2025)    OASIS : Social Isolation     Frequency of experiencing loneliness or isolation: Never   [4]   Family History  Problem Relation Name Age of Onset    Cancer Mother Breast Cancer     Arrhythmia Father Bobo     Atrial fibrillation Father Bobo     Valvular heart disease Sister      Arthritis Maternal Grandfather José Miguel    [5]   Current Outpatient Medications on File Prior to Visit   Medication Sig Dispense Refill    loratadine (Claritin) 10 mg tablet Take 1 tablet (10 mg) by mouth once daily.      multivitamin tablet Take 1 tablet by mouth once daily.      naproxen (Naprosyn) 250 mg tablet Take 1 tablet (250 mg) by mouth 2 times a day as needed for mild pain (1 - 3).      baclofen (Lioresal) 10 mg tablet take 1 tablet by mouth three times daily as needed muscle spasms 30 tablet 0    oxyCODONE-acetaminophen (Percocet) 5-325 mg tablet take 1 tablet by mouth every 4-6 hours as needed pain 42 tablet 0    [DISCONTINUED] acetaminophen (Tylenol) 325 mg tablet Take 2 tablets (650 mg) by mouth every 6 hours if needed for mild pain (1 - 3).       No current facility-administered medications on file prior to visit.

## 2025-04-22 NOTE — ASSESSMENT & PLAN NOTE
Stable   No acute compalaints stable   Orders:    JAK2 V617F Mutation by PCR, Qual; Future    CT cardiac scoring wo IV contrast; Future

## 2025-05-06 ENCOUNTER — LAB (OUTPATIENT)
Dept: LAB | Facility: HOSPITAL | Age: 55
End: 2025-05-06
Payer: COMMERCIAL

## 2025-05-06 PROCEDURE — 81270 JAK2 GENE: CPT

## 2025-05-07 LAB
ALBUMIN SERPL-MCNC: 4.6 G/DL (ref 3.6–5.1)
ALP SERPL-CCNC: 66 U/L (ref 35–144)
ALT SERPL-CCNC: 17 U/L (ref 9–46)
ANION GAP SERPL CALCULATED.4IONS-SCNC: 9 MMOL/L (CALC) (ref 7–17)
APPEARANCE UR: CLEAR
AST SERPL-CCNC: 17 U/L (ref 10–35)
BACTERIA UR CULT: NORMAL
BASOPHILS # BLD AUTO: 12 CELLS/UL (ref 0–200)
BASOPHILS NFR BLD AUTO: 0.2 %
BILIRUB SERPL-MCNC: 0.7 MG/DL (ref 0.2–1.2)
BILIRUB UR QL STRIP: NEGATIVE
BUN SERPL-MCNC: 18 MG/DL (ref 7–25)
CALCIUM SERPL-MCNC: 10 MG/DL (ref 8.6–10.3)
CHLORIDE SERPL-SCNC: 103 MMOL/L (ref 98–110)
CHOLEST SERPL-MCNC: 201 MG/DL
CHOLEST/HDLC SERPL: 3.8 (CALC)
CO2 SERPL-SCNC: 29 MMOL/L (ref 20–32)
COLOR UR: YELLOW
CREAT SERPL-MCNC: 1.09 MG/DL (ref 0.7–1.3)
EGFRCR SERPLBLD CKD-EPI 2021: 81 ML/MIN/1.73M2
EOSINOPHIL # BLD AUTO: 122 CELLS/UL (ref 15–500)
EOSINOPHIL NFR BLD AUTO: 2.1 %
ERYTHROCYTE [DISTWIDTH] IN BLOOD BY AUTOMATED COUNT: 13.2 % (ref 11–15)
EST. AVERAGE GLUCOSE BLD GHB EST-MCNC: 97 MG/DL
EST. AVERAGE GLUCOSE BLD GHB EST-SCNC: 5.4 MMOL/L
GLUCOSE SERPL-MCNC: 90 MG/DL (ref 65–99)
GLUCOSE UR QL STRIP: NEGATIVE
HBA1C MFR BLD: 5 %
HCT VFR BLD AUTO: 49.2 % (ref 38.5–50)
HDLC SERPL-MCNC: 53 MG/DL
HGB BLD-MCNC: 16.6 G/DL (ref 13.2–17.1)
HGB UR QL STRIP: NEGATIVE
KETONES UR QL STRIP: NEGATIVE
LDLC SERPL CALC-MCNC: 121 MG/DL (CALC)
LEUKOCYTE ESTERASE UR QL STRIP: NEGATIVE
LYMPHOCYTES # BLD AUTO: 1641 CELLS/UL (ref 850–3900)
LYMPHOCYTES NFR BLD AUTO: 28.3 %
MCH RBC QN AUTO: 31.9 PG (ref 27–33)
MCHC RBC AUTO-ENTMCNC: 33.7 G/DL (ref 32–36)
MCV RBC AUTO: 94.4 FL (ref 80–100)
MONOCYTES # BLD AUTO: 522 CELLS/UL (ref 200–950)
MONOCYTES NFR BLD AUTO: 9 %
NEUTROPHILS # BLD AUTO: 3503 CELLS/UL (ref 1500–7800)
NEUTROPHILS NFR BLD AUTO: 60.4 %
NITRITE UR QL STRIP: NEGATIVE
NONHDLC SERPL-MCNC: 148 MG/DL (CALC)
PH UR STRIP: 5.5 [PH] (ref 5–8)
PLATELET # BLD AUTO: 247 THOUSAND/UL (ref 140–400)
PMV BLD REES-ECKER: 9.9 FL (ref 7.5–12.5)
POTASSIUM SERPL-SCNC: 4.6 MMOL/L (ref 3.5–5.3)
PROT SERPL-MCNC: 7.4 G/DL (ref 6.1–8.1)
PROT UR QL STRIP: NEGATIVE
PSA SERPL-MCNC: 1.54 NG/ML
RBC # BLD AUTO: 5.21 MILLION/UL (ref 4.2–5.8)
SERVICE CMNT-IMP: NORMAL
SODIUM SERPL-SCNC: 141 MMOL/L (ref 135–146)
SP GR UR STRIP: 1.01 (ref 1–1.03)
TRIGL SERPL-MCNC: 153 MG/DL
TSH SERPL-ACNC: 1.59 MIU/L (ref 0.4–4.5)
WBC # BLD AUTO: 5.8 THOUSAND/UL (ref 3.8–10.8)

## 2025-05-12 LAB
ELECTRONICALLY SIGNED BY: NORMAL
JAK2 V617F INTERPRETATION: NORMAL
JAK2 V617F RESULT: NOT DETECTED

## 2025-08-13 ENCOUNTER — HOSPITAL ENCOUNTER (OUTPATIENT)
Dept: RADIOLOGY | Facility: CLINIC | Age: 55
Discharge: HOME | End: 2025-08-13
Payer: COMMERCIAL

## 2025-08-13 DIAGNOSIS — E78.5 HYPERLIPIDEMIA, UNSPECIFIED HYPERLIPIDEMIA TYPE: ICD-10-CM

## 2025-08-13 DIAGNOSIS — G47.33 OSA (OBSTRUCTIVE SLEEP APNEA): ICD-10-CM

## 2025-08-13 DIAGNOSIS — Z00.00 PHYSICAL EXAM: ICD-10-CM

## 2025-08-13 DIAGNOSIS — Z12.83 SKIN EXAM, SCREENING FOR CANCER: ICD-10-CM

## 2025-08-13 DIAGNOSIS — J45.902 ASTHMA WITH STATUS ASTHMATICUS, UNSPECIFIED ASTHMA SEVERITY, UNSPECIFIED WHETHER PERSISTENT (HHS-HCC): ICD-10-CM

## 2025-08-13 DIAGNOSIS — E83.119 HEMOCHROMATOSIS, UNSPECIFIED HEMOCHROMATOSIS TYPE: ICD-10-CM

## 2025-08-13 PROCEDURE — 75571 CT HRT W/O DYE W/CA TEST: CPT

## 2025-09-23 ENCOUNTER — APPOINTMENT (OUTPATIENT)
Dept: DERMATOLOGY | Facility: CLINIC | Age: 55
End: 2025-09-23
Payer: COMMERCIAL

## (undated) DEVICE — SUTURE, ETHIBOND, 2, 30 IN, CT2, GREEN

## (undated) DEVICE — APPLICATOR, CHLORAPREP, W/ORANGE TINT, 26ML

## (undated) DEVICE — KIT, MINOR, DOUBLE BASIN

## (undated) DEVICE — IRRIGATION SYSTEM, WOUND, PULSAVAC PLUS

## (undated) DEVICE — SUTURE, VICRYL, 1, 36 IN, CT-1, UNDYED

## (undated) DEVICE — SYRINGE, 50 CC, LUER LOCK

## (undated) DEVICE — SEALER, BIPOLAR, AQUA MANTYS 6.0

## (undated) DEVICE — TIP, SUCTION, YANKAUER, FLEXIBLE

## (undated) DEVICE — DRESSING, MEPILEX FOAM BORDER AG, SILVER, 4 X 4

## (undated) DEVICE — SUTURE, CTD, VICRYL, 2-0, UND, BR, CT-2

## (undated) DEVICE — STRIP, SKIN CLOSURE, STERI STRIP, REINFORCED, 0.5 X 4 IN

## (undated) DEVICE — ADHESIVE, SKIN, DERMABOND ADVANCED, 15CM, PEN-STYLE

## (undated) DEVICE — TRAY, DRY PREP, PREMIUM

## (undated) DEVICE — GLOVE, SURGICAL, PROTEXIS PI , 8.0, PF, LF

## (undated) DEVICE — SUTURE, STRATAFIX, 3-0 MONOCRYL PLUS, PS-2 SPIRAL UNDYED

## (undated) DEVICE — ELECTRODE, ELECTROSURGICAL, BLADE, NONSTICK, MODIFIED, 6.5 IN X 165 MM

## (undated) DEVICE — DRAPE, INCISE, ANTIMICROBIAL, IOBAN 2, 13 X 13 IN, DISPOSABLE, STERILE

## (undated) DEVICE — SOLUTION, INJECTION, USP, LACTATED RINGERS, LIFECARE, 1000ML

## (undated) DEVICE — TRACKING KIT, HIP PROCEDURES, VIZADISC

## (undated) DEVICE — WATER STERILE, FOR IRRIGATION, 1000ML, W/HANGER

## (undated) DEVICE — DRAPE KIT, RIO

## (undated) DEVICE — ESYSUIT SUPINE SURGICAL SUIT

## (undated) DEVICE — Device

## (undated) DEVICE — ELECTRODE, ELECTROSURGICAL, BLADE, EXTENDED

## (undated) DEVICE — WOUND SYSTEM, DEBRIDEMENT & CLEANING, O.R DUOPAK

## (undated) DEVICE — BLADE, SAW, SAGITTAL, 25 X 73 X 1.24MM, SS, STERILE

## (undated) DEVICE — COVER, TABLE, 44X90

## (undated) DEVICE — CATHETER TRAY, SURESTEP, 14FR, PRECONNECTED DRAIN BAG

## (undated) DEVICE — DRAPE, SHEET, U, STERI DRAPE, 47 X 51 IN, DISPOSABLE, STERILE

## (undated) DEVICE — SUTURE, VICRYL, 2-0, 18 IN CP-2, UNDYED

## (undated) DEVICE — BANDAGE, COFLEX, 6 X 5 YDS, TAN, STERILE, LF

## (undated) DEVICE — DRAPE, SHEET, U, W/ADHESIVE STRIP, IMPERVIOUS, 60 X 70 IN, DISPOSABLE, LF, STERILE

## (undated) DEVICE — DRESSING, MEPILEX BORDER, POST-OP AG, 4 X 10 IN

## (undated) DEVICE — NEEDLE, SPINAL, S/SU, 18GA 3IN, QUINCKE, STERILE

## (undated) DEVICE — SUTURE, MONOCRYL, 3-0, 27 IN, PS-2, UNDYED

## (undated) DEVICE — SOLUTION, CHLORHEXIDINE, 4%, 4OZ

## (undated) DEVICE — ADHESIVE, SKIN, MASTISOL, 2/3 CC VIAL

## (undated) DEVICE — DRESSING, GAUZE, SPONGE, KERLIX, SUPER, 6 X 6.75 IN, STERILE 10PK

## (undated) DEVICE — NEEDLE, SPINAL, QUINCKE, 18 G X 3.5 IN, PINK HUB

## (undated) DEVICE — STRYKER RIO DRAPE KIT (FORMERLY MFR'D BY MAKO)

## (undated) DEVICE — HOLDER, LIMB, SINGLE STRAP, SINGLE CLIP, Q/R, FOAM

## (undated) DEVICE — BANDAGE, COFLEX, 6 X 5 YDS, FOAM TAN, STERILE, LF

## (undated) DEVICE — SUTURE, FIBERWIRE 2, T-5 TAPER NEEDLE, 38"

## (undated) DEVICE — HOOD, SURGICAL, FLYTE HYBRID

## (undated) DEVICE — DRAPE PACK, TOTAL HIP, CUSTOM, GEAUGA

## (undated) DEVICE — DRAPE KIT, ESYSUIT, HIP

## (undated) DEVICE — HEMOSTAT, ARISTA, ABSORBABLE, 3 GRAMS